# Patient Record
Sex: FEMALE | Race: WHITE | NOT HISPANIC OR LATINO | Employment: FULL TIME | URBAN - METROPOLITAN AREA
[De-identification: names, ages, dates, MRNs, and addresses within clinical notes are randomized per-mention and may not be internally consistent; named-entity substitution may affect disease eponyms.]

---

## 2017-01-03 ENCOUNTER — APPOINTMENT (OUTPATIENT)
Dept: PHYSICAL THERAPY | Facility: CLINIC | Age: 37
End: 2017-01-03
Payer: COMMERCIAL

## 2017-01-03 ENCOUNTER — GENERIC CONVERSION - ENCOUNTER (OUTPATIENT)
Dept: OTHER | Facility: OTHER | Age: 37
End: 2017-01-03

## 2017-01-03 PROCEDURE — 97014 ELECTRIC STIMULATION THERAPY: CPT

## 2017-01-03 PROCEDURE — 97162 PT EVAL MOD COMPLEX 30 MIN: CPT

## 2017-01-03 PROCEDURE — 97110 THERAPEUTIC EXERCISES: CPT

## 2017-01-05 ENCOUNTER — APPOINTMENT (OUTPATIENT)
Dept: PHYSICAL THERAPY | Facility: CLINIC | Age: 37
End: 2017-01-05
Payer: COMMERCIAL

## 2017-01-05 PROCEDURE — 97110 THERAPEUTIC EXERCISES: CPT

## 2017-01-05 PROCEDURE — 97014 ELECTRIC STIMULATION THERAPY: CPT

## 2017-01-05 PROCEDURE — 97140 MANUAL THERAPY 1/> REGIONS: CPT

## 2017-01-09 ENCOUNTER — ALLSCRIPTS OFFICE VISIT (OUTPATIENT)
Dept: OTHER | Facility: OTHER | Age: 37
End: 2017-01-09

## 2017-01-10 ENCOUNTER — APPOINTMENT (OUTPATIENT)
Dept: PHYSICAL THERAPY | Facility: CLINIC | Age: 37
End: 2017-01-10
Payer: COMMERCIAL

## 2017-01-10 PROCEDURE — 97110 THERAPEUTIC EXERCISES: CPT

## 2017-01-10 PROCEDURE — 97014 ELECTRIC STIMULATION THERAPY: CPT

## 2017-01-10 PROCEDURE — 97140 MANUAL THERAPY 1/> REGIONS: CPT

## 2017-01-12 ENCOUNTER — APPOINTMENT (OUTPATIENT)
Dept: PHYSICAL THERAPY | Facility: CLINIC | Age: 37
End: 2017-01-12
Payer: COMMERCIAL

## 2017-01-13 ENCOUNTER — APPOINTMENT (OUTPATIENT)
Dept: PHYSICAL THERAPY | Facility: CLINIC | Age: 37
End: 2017-01-13
Payer: COMMERCIAL

## 2017-01-13 PROCEDURE — 97140 MANUAL THERAPY 1/> REGIONS: CPT

## 2017-01-13 PROCEDURE — 97110 THERAPEUTIC EXERCISES: CPT

## 2017-01-13 PROCEDURE — 97014 ELECTRIC STIMULATION THERAPY: CPT

## 2017-01-19 ENCOUNTER — APPOINTMENT (OUTPATIENT)
Dept: PHYSICAL THERAPY | Facility: CLINIC | Age: 37
End: 2017-01-19
Payer: COMMERCIAL

## 2017-01-19 PROCEDURE — 97140 MANUAL THERAPY 1/> REGIONS: CPT

## 2017-01-19 PROCEDURE — 97110 THERAPEUTIC EXERCISES: CPT

## 2017-01-26 ENCOUNTER — APPOINTMENT (OUTPATIENT)
Dept: PHYSICAL THERAPY | Facility: CLINIC | Age: 37
End: 2017-01-26
Payer: COMMERCIAL

## 2017-01-30 ENCOUNTER — ALLSCRIPTS OFFICE VISIT (OUTPATIENT)
Dept: OTHER | Facility: OTHER | Age: 37
End: 2017-01-30

## 2017-02-02 ENCOUNTER — APPOINTMENT (OUTPATIENT)
Dept: PHYSICAL THERAPY | Facility: CLINIC | Age: 37
End: 2017-02-02
Payer: COMMERCIAL

## 2017-02-02 ENCOUNTER — TRANSCRIBE ORDERS (OUTPATIENT)
Dept: LAB | Facility: CLINIC | Age: 37
End: 2017-02-02

## 2017-02-02 ENCOUNTER — APPOINTMENT (OUTPATIENT)
Dept: LAB | Facility: CLINIC | Age: 37
End: 2017-02-02
Payer: COMMERCIAL

## 2017-02-02 DIAGNOSIS — N92.0 EXCESSIVE OR FREQUENT MENSTRUATION: Primary | ICD-10-CM

## 2017-02-02 DIAGNOSIS — N92.0 EXCESSIVE OR FREQUENT MENSTRUATION: ICD-10-CM

## 2017-02-02 LAB
BASOPHILS # BLD AUTO: 0.01 THOUSANDS/ΜL (ref 0–0.1)
BASOPHILS NFR BLD AUTO: 0 % (ref 0–1)
EOSINOPHIL # BLD AUTO: 0.15 THOUSAND/ΜL (ref 0–0.61)
EOSINOPHIL NFR BLD AUTO: 3 % (ref 0–6)
ERYTHROCYTE [DISTWIDTH] IN BLOOD BY AUTOMATED COUNT: 14.8 % (ref 11.6–15.1)
FERRITIN SERPL-MCNC: 3 NG/ML (ref 8–388)
HCT VFR BLD AUTO: 32.9 % (ref 34.8–46.1)
HGB BLD-MCNC: 9.9 G/DL (ref 11.5–15.4)
IRON SERPL-MCNC: 38 UG/DL (ref 50–170)
LYMPHOCYTES # BLD AUTO: 1.5 THOUSANDS/ΜL (ref 0.6–4.47)
LYMPHOCYTES NFR BLD AUTO: 25 % (ref 14–44)
MCH RBC QN AUTO: 24.4 PG (ref 26.8–34.3)
MCHC RBC AUTO-ENTMCNC: 30.1 G/DL (ref 31.4–37.4)
MCV RBC AUTO: 81 FL (ref 82–98)
MONOCYTES # BLD AUTO: 0.33 THOUSAND/ΜL (ref 0.17–1.22)
MONOCYTES NFR BLD AUTO: 6 % (ref 4–12)
NEUTROPHILS # BLD AUTO: 3.92 THOUSANDS/ΜL (ref 1.85–7.62)
NEUTS SEG NFR BLD AUTO: 66 % (ref 43–75)
PLATELET # BLD AUTO: 284 THOUSANDS/UL (ref 149–390)
PMV BLD AUTO: 10.3 FL (ref 8.9–12.7)
RBC # BLD AUTO: 4.06 MILLION/UL (ref 3.81–5.12)
WBC # BLD AUTO: 5.91 THOUSAND/UL (ref 4.31–10.16)

## 2017-02-02 PROCEDURE — 83540 ASSAY OF IRON: CPT

## 2017-02-02 PROCEDURE — 97110 THERAPEUTIC EXERCISES: CPT

## 2017-02-02 PROCEDURE — 36415 COLL VENOUS BLD VENIPUNCTURE: CPT

## 2017-02-02 PROCEDURE — 82728 ASSAY OF FERRITIN: CPT

## 2017-02-02 PROCEDURE — 85025 COMPLETE CBC W/AUTO DIFF WBC: CPT

## 2017-02-21 ENCOUNTER — ALLSCRIPTS OFFICE VISIT (OUTPATIENT)
Dept: OTHER | Facility: OTHER | Age: 37
End: 2017-02-21

## 2017-06-15 ENCOUNTER — GENERIC CONVERSION - ENCOUNTER (OUTPATIENT)
Dept: OTHER | Facility: OTHER | Age: 37
End: 2017-06-15

## 2017-10-26 ENCOUNTER — ALLSCRIPTS OFFICE VISIT (OUTPATIENT)
Dept: OTHER | Facility: OTHER | Age: 37
End: 2017-10-26

## 2017-10-27 NOTE — PROGRESS NOTES
Assessment  1  Menorrhagia (626 2) (N92 0)    Plan  Menorrhagia    · Tranexamic Acid 650 MG Oral Tablet (Lysteda); 2 pills 3 times a day with menses   Rx By: Hiwot Garcia; Dispense: 10 Days ; #:30 Tablet; Refill: 8;For: Menorrhagia; MICEHLLE = N; Verified Transmission to [facility] Last Updated By: System, PLASTIQ; 10/26/2017 9:06:31 AM    Discussion/Summary  Discussion Summary:   Menorrhagia with regular cycle - spent a lot of time reviewing that the D and C would really only be helpful for the cycle that we did it in  We reviewed all of the other options with her to help with decreasing the flow of the menses  We spent time reviewing the options of lLysteda, the progesterone only options including Mirena, and the surgical options including endometrial ablation and hysterectomy  She is not currently using anything for contraception nor has her  had a vasectomy  We did review that some of these options that she is looking into particularly the ablation would require some additional type of contraception  She still feels that the [facility]  might be worthwhile as a  baby step to working through the menorrhagia  At this time she is given information about the Mirena to review with her pathologist to is aware of the breast cancer gene that she tested positive for, although it is not BRCA1 or BRCA2, and we discussed at length the endometrial ablation procedure, risks and benefits and possible sequela, as well as the need for contraception should we proceed with ablation  At this time she is elected to proceed with the use of Lysteda and a prescription was sent to her pharmacy  If she decides that she also proceed with a hysteroscopy/D&C she will give the office call we will schedule it and then see her again for her H&P just prior to that procedure   Again she was made aware that I felt the D&C might only help for that particular cycle but she feels that that might be an option she wants to try before proceeding with anything else  Counseling Documentation With Imm: The patient was counseled regarding instructions for management,-- prognosis,-- risks and benefits of treatment options  total time of encounter was 20 minutes-- and-- 20 minutes was spent counseling  Goals and Barriers: The patient has the current Goals: Discuss possible surgical procedures  The patent has the current Barriers: None  Patient's Capacity to Self-Care: Patient is able to Self-Care  Medication SE Review and Pt Understands Tx: The treatment plan was reviewed with the patient/guardian  The patient/guardian understands and agrees with the treatment plan      Chief Complaint  Chief Complaint Free Text Note Form: menorrhagia - anemia      History of Present Illness  HPI: Here to review possible D & C - patient has regular menses - heavy - and anemia requiring iron transfusions -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 which she says was normal without any fibroids or other changes - she tested positive for one of the newer breast cancer genes and is nervous about any type of hormonal therapy - thought a D and C would help to clean out anything that may be causing the heavier bleeding -      Review of Systems  Focused-Female:   Constitutional: No fever, no chills, feels well, no tiredness, no recent weight gain or loss  Cardiovascular: no complaints of slow or fast heart rate, no chest pain, no palpitations, no leg claudication or lower extremity edema  Respiratory: no complaints of shortness of breath, no wheezing, no dyspnea on exertion, no orthopnea or PND  Breasts: no complaints of breast pain, breast lump or nipple discharge  Gastrointestinal: no complaints of abdominal pain, no constipation, no nausea or diarrhea, no vomiting, no bloody stools  Genitourinary: no complaints of dysuria, no incontinence, no pelvic pain, no dysmenorrhea, no vaginal discharge or abnormal vaginal bleeding     Musculoskeletal: no complaints of arthralgia, no myalgia, no joint swelling or stiffness, no limb pain or swelling  Neurological: no complaints of headache, no confusion, no numbness or tingling, no dizziness or fainting  ROS Reviewed:   ROS reviewed  Active Problems  1  Derangement of lateral meniscus of left knee (717 40) (M23 301)   2  Derangement of lateral meniscus of right knee (717 40) (M23 300)   3  Derangement of medial meniscus of left knee (717 3) (M23 304)   4  Encounter for gynecological examination (V72 31) (Z01 419)   5  Left knee pain (719 46) (M25 562)   6  Menorrhagia (626 2) (N92 0)    Past Medical History  1  History of Breast cancer screening (V76 10) (Z12 39)   2  History of  2  Active Problems And Past Medical History Reviewed: The active problems and past medical history were reviewed and updated today  Surgical History  1  History of  Section   2  History of Gallbladder Surgery   3  History of Knee Surgery  Surgical History Reviewed: The surgical history was reviewed and updated today  Family History  Mother    1  Family history of malignant neoplasm of breast (V16 3) (Z80 3)  Family History Reviewed: The family history was reviewed and updated today  Social History   · Never a smoker  Social History Reviewed: The social history was reviewed and updated today  Current Meds   1  Daily Multivitamin TABS; Therapy: (Recorded:2017) to Recorded   2  Iron 325 (65 Fe) MG Oral Tablet; Therapy: (Recorded:2017) to Recorded   3  Iron Combinations INJ; Therapy: (Recorded:2017) to Recorded   4  Probiotic CAPS; Therapy: (Recorded:2017) to Recorded   5  Vitamin D 1000 UNIT CAPS; Therapy: (Recorded:2017) to Recorded  Medication List Reviewed: The medication list was reviewed and updated today  Allergies  1  Penicillins  2   Shellfish    Vitals  Vital Signs    Recorded: [id number] 85:24VM   Systolic 016, LUE, Sitting   Diastolic 76, LUE, Sitting Height 5 ft    Weight 197 lb    BMI Calculated 38 47   BSA Calculated 1 85     Physical Exam    Constitutional   General appearance: No acute distress, well appearing and well nourished  -- short obese white female     Psychiatric   Orientation to person, place, and time: Normal     Mood and affect: Normal        Signatures   Electronically signed by : ASIA Cabral ; Oct 26 2017  9:35AM EST                       (Author)

## 2018-01-13 VITALS
WEIGHT: 188 LBS | DIASTOLIC BLOOD PRESSURE: 82 MMHG | SYSTOLIC BLOOD PRESSURE: 134 MMHG | BODY MASS INDEX: 36.91 KG/M2 | HEIGHT: 60 IN

## 2018-01-13 VITALS
BODY MASS INDEX: 38.68 KG/M2 | HEIGHT: 60 IN | WEIGHT: 197 LBS | DIASTOLIC BLOOD PRESSURE: 76 MMHG | SYSTOLIC BLOOD PRESSURE: 128 MMHG

## 2018-09-19 ENCOUNTER — ANNUAL EXAM (OUTPATIENT)
Dept: OBGYN CLINIC | Facility: CLINIC | Age: 38
End: 2018-09-19
Payer: COMMERCIAL

## 2018-09-19 VITALS
HEIGHT: 60 IN | WEIGHT: 197 LBS | DIASTOLIC BLOOD PRESSURE: 84 MMHG | SYSTOLIC BLOOD PRESSURE: 134 MMHG | BODY MASS INDEX: 38.68 KG/M2

## 2018-09-19 DIAGNOSIS — N92.0 MENORRHAGIA WITH REGULAR CYCLE: ICD-10-CM

## 2018-09-19 DIAGNOSIS — D50.9 IRON DEFICIENCY ANEMIA, UNSPECIFIED IRON DEFICIENCY ANEMIA TYPE: ICD-10-CM

## 2018-09-19 DIAGNOSIS — Z12.39 SCREENING BREAST EXAMINATION: ICD-10-CM

## 2018-09-19 DIAGNOSIS — Z01.419 WELL WOMAN EXAM: Primary | ICD-10-CM

## 2018-09-19 PROBLEM — M19.90 ARTHRITIS: Status: ACTIVE | Noted: 2017-10-26

## 2018-09-19 PROCEDURE — S0612 ANNUAL GYNECOLOGICAL EXAMINA: HCPCS | Performed by: PHYSICIAN ASSISTANT

## 2018-09-19 RX ORDER — OMEPRAZOLE 20 MG/1
CAPSULE, DELAYED RELEASE ORAL
COMMUNITY
Start: 2018-04-19 | End: 2022-07-19

## 2018-09-19 RX ORDER — BIOTIN 1 MG
TABLET ORAL
COMMUNITY
End: 2018-11-23 | Stop reason: ALTCHOICE

## 2018-09-19 RX ORDER — ALBUTEROL SULFATE 90 UG/1
2 AEROSOL, METERED RESPIRATORY (INHALATION) EVERY 6 HOURS
COMMUNITY
Start: 2018-05-07 | End: 2018-10-22 | Stop reason: ALTCHOICE

## 2018-09-19 RX ORDER — ALPRAZOLAM 0.5 MG/1
TABLET ORAL
Qty: 1 TABLET | Refills: 0 | Status: SHIPPED | OUTPATIENT
Start: 2018-09-19 | End: 2018-10-22 | Stop reason: ALTCHOICE

## 2018-09-19 RX ORDER — PNV NO.95/FERROUS FUM/FOLIC AC 28MG-0.8MG
TABLET ORAL
COMMUNITY
End: 2018-10-22 | Stop reason: ALTCHOICE

## 2018-09-19 RX ORDER — ALPRAZOLAM 0.25 MG/1
TABLET ORAL
Qty: 30 TABLET | Refills: 0 | Status: SHIPPED | OUTPATIENT
Start: 2018-09-19 | End: 2018-09-19 | Stop reason: CLARIF

## 2018-09-19 RX ORDER — TRANEXAMIC ACID 650 1/1
TABLET ORAL
COMMUNITY
Start: 2017-10-26 | End: 2018-10-22 | Stop reason: ALTCHOICE

## 2018-09-19 RX ORDER — ERGOCALCIFEROL (VITAMIN D2) 10 MCG
TABLET ORAL
COMMUNITY
End: 2018-11-23 | Stop reason: ALTCHOICE

## 2018-09-19 NOTE — PATIENT INSTRUCTIONS
Pt will plan endo bx and proceed with ablation at this point  Aware pelvic US also to be done prior to ablation  Will plan CBC as well to see if additional infusions are needed prior to procedure

## 2018-09-19 NOTE — PROGRESS NOTES
Assessment/Plan   Diagnoses and all orders for this visit:    Well woman exam  -     GP Liquid-Based Pap + HPV Plus    Screening breast examination  -     Mammo screening bilateral w 3d & cad; Future    Iron deficiency anemia, unspecified iron deficiency anemia type  -     CBC and differential; Future  -     Iron, TIBC and Ferritin Panel; Future    Other orders  -     albuterol (PROVENTIL HFA,VENTOLIN HFA) 90 mcg/act inhaler; Inhale 2 puffs every 6 (six) hours  -     Multiple Vitamin (DAILY VALUE MULTIVITAMIN) TABS; Take by mouth  -     Ferrous Sulfate (IRON) 325 (65 Fe) MG TABS; Take by mouth  -     omeprazole (PriLOSEC) 20 mg delayed release capsule; As needed  -     Probiotic Product (PROBIOTIC-10) CAPS; Take by mouth  -     Tranexamic Acid 650 MG TABS; Take by mouth  -     Cholecalciferol (VITAMIN D3) 1000 units CAPS; Take by mouth        Discussion    All questions have been answered to her satisfaction  RTO for APE or sooner if needed      Subjective     Pt for annual GYN exam  Pt with continued menorrhagia  She is anxious to proceed with endo bx but does desire ablation to improve sx  She has already had surgery discussion w Dr Gracie Thompson  I thoroughly r/w pt endo bx procedure  Pt denies vaginal d/c or GYN complaints other than cycles  Nighat rOr is a 34 y o  female who presents for annual well woman exam      No vulvar itch/burn; No vaginal itch/burn; No abn discharge or odor; No urinary sx - burning/pain/frequency/hematuria  (+) SBEs - no breast masses, asymmetry, nipple discharge or bleeding, changes in skin of breast, or breast tenderness bilaterally  No abd/pelvic pain or HAs; No menopausal symptoms: No hot flashes/night sweats, problems with intercourse, vaginal dryness; sleeping well;   Pt is sexually active in a mutually monog/ sexual relationship; No issues with intercourse;  She declines std/hiv/hep testing; Feels safe at home  Current contraception: rhythm method    (+) PCP for routine Bw/care;      Review of Systems   Constitutional: Negative for fatigue, fever and unexpected weight change  HENT: Negative for dental problem, mouth sores, nosebleeds, rhinorrhea, sinus pain, sinus pressure and sore throat  Eyes: Negative for pain, discharge and visual disturbance  Respiratory: Negative for cough, chest tightness, shortness of breath and wheezing  Cardiovascular: Negative for chest pain, palpitations and leg swelling  Gastrointestinal: Negative for blood in stool, constipation, diarrhea, nausea and vomiting  Endocrine: Negative for polydipsia  Genitourinary: Positive for menstrual problem and vaginal bleeding  Negative for difficulty urinating, dyspareunia, dysuria, pelvic pain, urgency, vaginal discharge and vaginal pain  Musculoskeletal: Negative for arthralgias, back pain and joint swelling  Allergic/Immunologic: Negative for environmental allergies  Neurological: Negative for seizures, light-headedness and headaches  Hematological: Does not bruise/bleed easily  Psychiatric/Behavioral: Negative for sleep disturbance  The patient is not nervous/anxious          The following portions of the patient's history were reviewed and updated as appropriate: current medications, past family history, past medical history, past social history, past surgical history and problem list     Period Cycle (Days): 28  Period Duration (Days): 7  Period Pattern: Regular  Menstrual Flow: Heavy    OB History      Para Term  AB Living    2 2            SAB TAB Ectopic Multiple Live Births            2          Past Medical History:   Diagnosis Date    Anemia        Past Surgical History:   Procedure Laterality Date    ABDOMINAL SURGERY      2 c-sections    ARTHROSCOPY KNEE Left 2016    Procedure: ARTHROSCOPY KNEE LATERAL aND MEDIAL MENISCECTOMY;  Surgeon: Kamilah Alejo MD;  Location: [facility] MAIN OR;  Service:    [address]   lap    KNEE ARTHROSCOPY Left 2007       Family History   Problem Relation Age of Onset    Hyperlipidemia Mother     Hypertension Mother    Analy [place] Breast cancer Mother     Hypertension Father     Heart disease Father     Breast cancer Maternal Grandmother        Social History     Social History    Marital status: /Civil Union     Spouse name: N/A    Number of children: N/A    Years of education: N/A     Occupational History    Not on file       Social History Main Topics    Smoking status: Never Smoker    Smokeless tobacco: Never Used    Alcohol use No    Drug use: No    Sexual activity: Yes     Partners: Male     Birth control/ protection: None     Other Topics Concern    Not on file     Social History Narrative    No narrative on file         Current Outpatient Prescriptions:     albuterol (PROVENTIL HFA,VENTOLIN HFA) 90 mcg/act inhaler, Inhale 2 puffs every 6 (six) hours, Disp: , Rfl:     omeprazole (PriLOSEC) 20 mg delayed release capsule, As needed, Disp: , Rfl:     Tranexamic Acid 650 MG TABS, Take by mouth, Disp: , Rfl:     Cholecalciferol (VITAMIN D-3 PO), Take by mouth 3 (three) times a week, Disp: , Rfl:     Cholecalciferol (VITAMIN D3) 1000 units CAPS, Take by mouth, Disp: , Rfl:     Ferrous Sulfate (IRON) 325 (65 Fe) MG TABS, Take by mouth, Disp: , Rfl:     Lactobacillus-Inulin ([employer] DIGESTIVE HEALTH PO), Take by mouth 3 (three) times a week, Disp: , Rfl:     Multiple Vitamin (DAILY VALUE MULTIVITAMIN) TABS, Take by mouth, Disp: , Rfl:     Probiotic Product (PROBIOTIC-10) CAPS, Take by mouth, Disp: , Rfl:     Allergies   Allergen Reactions    Other Hives     Shrimp/shellfish    Penicillin G Hives    Shellfish Allergy Hives and Edema       Objective   Vitals:    09/19/18 1140   BP: 134/84   BP Location: Left arm   Patient Position: Sitting   Cuff Size: Large   Weight: 89 4 kg (197 lb)   Height: 5' (1 524 m)     Physical Exam   Constitutional: She is oriented to person, place, and time  She appears well-developed and well-nourished  HENT:   Head: Normocephalic and atraumatic  Cardiovascular: Normal rate and regular rhythm  Pulmonary/Chest: Effort normal and breath sounds normal  Right breast exhibits no inverted nipple, no mass, no nipple discharge, no skin change and no tenderness  Left breast exhibits no inverted nipple, no mass, no nipple discharge, no skin change and no tenderness  Breasts are symmetrical    Abdominal: Soft  She exhibits no distension and no mass  There is no rebound and no guarding  Genitourinary: Vagina normal and uterus normal  Rectal exam shows guaiac negative stool  Neurological: She is alert and oriented to person, place, and time  Skin: Skin is warm and dry  Psychiatric: She has a normal mood and affect  Patient Instructions   Pt will plan endo bx and proceed with ablation at this point  Aware pelvic US also to be done prior to ablation  Will plan CBC as well to see if additional infusions are needed prior to procedure

## 2018-09-23 LAB
HPV HR 12 DNA CVX QL NAA+PROBE: NOT DETECTED
HPV16 DNA SPEC QL NAA+PROBE: NOT DETECTED
HPV18 DNA SPEC QL NAA+PROBE: NOT DETECTED
THIN PREP CVX: NORMAL

## 2018-09-27 ENCOUNTER — HOSPITAL ENCOUNTER (OUTPATIENT)
Dept: RADIOLOGY | Age: 38
Discharge: HOME | End: 2018-09-27
Payer: COMMERCIAL

## 2018-09-27 DIAGNOSIS — N92.0 MENORRHAGIA WITH REGULAR CYCLE: ICD-10-CM

## 2018-09-27 PROCEDURE — 76856 US EXAM PELVIC COMPLETE: CPT

## 2018-09-27 PROCEDURE — 76830 TRANSVAGINAL US NON-OB: CPT

## 2018-10-17 PROCEDURE — 58100 BIOPSY OF UTERUS LINING: CPT | Performed by: PHYSICIAN ASSISTANT

## 2018-10-17 NOTE — PROGRESS NOTES
Endometrial biopsy  Date/Time: 10/17/2018 11:45 AM  Performed by: [facility]  Authorized by: [facility]     Consent:     Consent obtained:  Verbal and written    Consent given by:  Patient    Procedural risks discussed:  Bleeding, damage to other organs, death, failure rate, infection, possible continued pain, possible conversion to open surgery, possible loss of function and repeat procedure    Patient questions answered: yes      Patient agrees, verbalizes understanding, and wants to proceed: yes    Indication:     Indications: Other disorder of menstruation and other abnormal bleeding from female genital tract    Procedure:     Procedure: endometrial biopsy with Pipelle      A bivalve speculum was placed in the vagina: yes      Cervix cleaned and prepped: yes (with vinegar as pt has shellfish allergy)      A paracervical block was performed: no      An intracervical block was performed: no      The cervix was dilated: no      Uterus sounded: yes      Uterus sound depth (cm):  7 5    Specimen collected: specimen collected and sent to pathology      Patient tolerated procedure well with no complications: yes    Comments:      Pt plans to proceed with endometrial ablation  Will make preop appt to f/u w doc  She did discuss ablation last year w Dr Bishop Barakat and is ready to proceed

## 2018-10-22 ENCOUNTER — PROCEDURE VISIT (OUTPATIENT)
Dept: OBGYN CLINIC | Facility: CLINIC | Age: 38
End: 2018-10-22
Payer: COMMERCIAL

## 2018-10-22 ENCOUNTER — TELEPHONE (OUTPATIENT)
Dept: OBGYN CLINIC | Facility: CLINIC | Age: 38
End: 2018-10-22

## 2018-10-22 VITALS
BODY MASS INDEX: 38.48 KG/M2 | SYSTOLIC BLOOD PRESSURE: 128 MMHG | WEIGHT: 196 LBS | DIASTOLIC BLOOD PRESSURE: 76 MMHG | HEIGHT: 60 IN

## 2018-10-22 DIAGNOSIS — N92.0 MENORRHAGIA WITH REGULAR CYCLE: Primary | ICD-10-CM

## 2018-10-22 PROCEDURE — 58100 BIOPSY OF UTERUS LINING: CPT | Performed by: PHYSICIAN ASSISTANT

## 2018-10-24 LAB — BIOPSY SPEC-IMP: NORMAL

## 2018-11-19 ENCOUNTER — OFFICE VISIT (OUTPATIENT)
Dept: OBGYN CLINIC | Facility: CLINIC | Age: 38
End: 2018-11-19
Payer: COMMERCIAL

## 2018-11-19 VITALS
HEIGHT: 60 IN | BODY MASS INDEX: 37.5 KG/M2 | DIASTOLIC BLOOD PRESSURE: 84 MMHG | SYSTOLIC BLOOD PRESSURE: 136 MMHG | WEIGHT: 191 LBS

## 2018-11-19 DIAGNOSIS — N92.0 MENORRHAGIA WITH REGULAR CYCLE: Primary | ICD-10-CM

## 2018-11-19 PROCEDURE — 99213 OFFICE O/P EST LOW 20 MIN: CPT | Performed by: OBSTETRICS & GYNECOLOGY

## 2018-11-19 RX ORDER — INFLUENZA A VIRUS A/SINGAPORE/GP1908/2015 IVR-180A (H1N1) ANTIGEN (PROPIOLACTONE INACTIVATED), INFLUENZA A VIRUS A/HONG KONG/4801/2014 X-263B (H3N2) ANTIGEN (PROPIOLACTONE INACTIVATED), INFLUENZA B VIRUS B/BRISBANE/46/2015 ANTIGEN (PROPIOLACTONE INACTIVATED), AND INFLUENZA B VIRUS B/PHUKET/3073/2013 BVR-1B ANTIGEN (PROPIOLACTONE INACTIVATED) 15; 15; 15; 15 UG/.5ML; UG/.5ML; UG/.5ML; UG/.5ML
INJECTION, SUSPENSION INTRAMUSCULAR
Refills: 0 | COMMUNITY
Start: 2018-10-31 | End: 2018-11-23 | Stop reason: ALTCHOICE

## 2018-11-19 NOTE — PROGRESS NOTES
Karley Elizabeth is a 34 y o   female here for a problem visit  Patient has had heavy menses causing anemia they are relatively regular and has been getting IV iron infusions for about a year and half  Patient's iron still decreases but hemoglobin is back to normal level  Patient has been previously counseled and was counseled again regarding options as far as risks and benefits to treat her symptoms  Normal endometrial biopsy 2018  After reviewing options as well as differences of NovaSure minora patient desires to proceed with D&C hysteroscopy Naila ablation  Personal health questionnaire reviewed: yes  Gynecologic History  No LMP recorded  Contraception: rhythm method  Last Pap:  2018  Results were: normal  Endometrial biopsy 2018 normal  Last mammogram:  2018   Results were: normal    Obstetric History  OB History    Para Term  AB Living   2 2           SAB TAB Ectopic Multiple Live Births           2      # Outcome Date GA Lbr Xavier/2nd Weight Sex Delivery Anes PTL Lv   2 Para            1 Para                 Past Medical History:   Diagnosis Date    Anemia      Past Surgical History:   Procedure Laterality Date    ABDOMINAL SURGERY      2 c-sections    ARTHROSCOPY KNEE Left 2016    Procedure: ARTHROSCOPY KNEE LATERAL aND MEDIAL MENISCECTOMY;  Surgeon: Aleksandra Matos MD;  Location: [facility] [place] MAIN OR;  Service:      SECTION      CHOLECYSTECTOMY      lap    KNEE ARTHROSCOPY Left      Current Outpatient Prescriptions on File Prior to Visit   Medication Sig Dispense Refill    Cholecalciferol (VITAMIN D-3 PO) Take by mouth 3 (three) times a week      Cholecalciferol (VITAMIN D3) 1000 units CAPS Take by mouth      Lactobacillus-Inulin (CULTUREE DIGESTIVE HEALTH PO) Take by mouth 3 (three) times a week      Multiple Vitamin (DAILY VALUE MULTIVITAMIN) TABS Take by mouth      omeprazole (PriLOSEC) 20 mg delayed release capsule As needed      Probiotic Product (PROBIOTIC-10) CAPS Take by mouth       No current facility-administered medications on file prior to visit  Allergies   Allergen Reactions    Other Hives     Shrimp/shellfish    Penicillin G Hives    Shellfish Allergy Hives and Edema     Social History     Social History    Marital status: /Civil Union     Spouse name: N/A    Number of children: N/A    Years of education: N/A     Occupational History    Not on file  Social History Main Topics    Smoking status: Never Smoker    Smokeless tobacco: Never Used    Alcohol use No    Drug use: No    Sexual activity: Yes     Partners: Male     Birth control/ protection: None     Other Topics Concern    Not on file     Social History Narrative    No narrative on file         Review of Systems  Review of Systems   Constitutional: Negative for fatigue, fever and unexpected weight change  HENT: Negative for dental problem, mouth sores, nosebleeds, rhinorrhea, sinus pain, sinus pressure and sore throat  Eyes: Negative for pain, discharge and visual disturbance  Respiratory: Negative for cough, chest tightness, shortness of breath and wheezing  Cardiovascular: Negative for chest pain, palpitations and leg swelling  Gastrointestinal: Negative for blood in stool, constipation, diarrhea, nausea and vomiting  Endocrine: Negative for polydipsia  Genitourinary: Negative for difficulty urinating, dyspareunia, dysuria, menstrual problem, pelvic pain, urgency, vaginal discharge and vaginal pain  Musculoskeletal: Negative for arthralgias, back pain and joint swelling  Allergic/Immunologic: Negative for environmental allergies  Neurological: Negative for seizures, light-headedness and headaches  Hematological: Does not bruise/bleed easily  Psychiatric/Behavioral: Negative for sleep disturbance  The patient is not nervous/anxious      All other systems reviewed and are negative  Objective     /84 (BP Location: Left arm, Patient Position: Sitting, Cuff Size: Standard)   Ht 5' (1 524 m)   Wt 86 6 kg (191 lb)   BMI 37 30 kg/m²   Lungs: clear to auscultation bilaterally  Heart: regular rate and rhythm, S1, S2 normal, no murmur, click, rub or gallop  Abdomen: soft, non-tender; bowel sounds normal; no masses,  no organomegaly      Assessment    34year-old  history  x2 with heavy menses and anemia       Plan    D&C hysteroscopy Naila ablation

## 2018-11-20 PROBLEM — N92.0 MENORRHAGIA WITH REGULAR CYCLE: Status: ACTIVE | Noted: 2018-11-20

## 2018-11-25 NOTE — H&P
Skylar Jacques is a 47-year-old  with heavy menses causing anemia  Patient has been requiring IV iron infusions for about 18 months  Patient's hemoglobin is back to normal levels though her iron is still decreased  Patient fully counseled regarding options as well as risks and benefits of different procedures  Patient with normal endometrial biopsy 2018  Patient desires to proceed with D&C hysteroscopy Naila ablation after being fully counseled to options as well as risks and benefits  Last Pap 2018 normal  Last mammogram 2018 normal  Endometrial biopsy 2018 normal    Past Medical History:   Diagnosis Date    Anemia     Arthritis      Past Surgical History:   Procedure Laterality Date    ABDOMINAL SURGERY      2 c-sections    ARTHROSCOPY KNEE Left 2016    Procedure: ARTHROSCOPY KNEE LATERAL aND MEDIAL MENISCECTOMY;  Surgeon: Stepyh Lorenzo MD;  Location: [place] [address] MAIN OR;  Service:      SECTION      CHOLECYSTECTOMY      lap    KNEE ARTHROSCOPY Left      OB History      Para Term  AB Living    2 2            SAB TAB Ectopic Multiple Live Births            2          No current facility-administered medications on file prior to encounter        Current Outpatient Prescriptions on File Prior to Encounter   Medication Sig Dispense Refill    omeprazole (PriLOSEC) 20 mg delayed release capsule As needed       Allergies   Allergen Reactions    Other Hives     Shrimp/shellfish    Penicillin G Hives    Pepcid [Famotidine] Chest Pain    Shellfish Allergy Hives and Edema     Social History   Substance Use Topics    Smoking status: Never Smoker    Smokeless tobacco: Never Used    Alcohol use No    Drug use no  Monogamous  using rhythm method for contraception    Physical exam:  Height 5 foot, weight 191 lb (86 6 kg), BMI 37 3, /84    Lungs:  CTAB  Heart:  RRR S1-S2  Abdomen:  Soft nontender positive bowel sounds no organomegaly    Assessment:  59-year-old  history of  x2 with heavy menses and anemia  Plan:  D&C hysteroscopy Naila ablation

## 2018-11-28 ENCOUNTER — ANESTHESIA EVENT (OUTPATIENT)
Dept: PERIOP | Facility: HOSPITAL | Age: 38
End: 2018-11-28
Payer: COMMERCIAL

## 2018-11-29 ENCOUNTER — ANESTHESIA (OUTPATIENT)
Dept: PERIOP | Facility: HOSPITAL | Age: 38
End: 2018-11-29
Payer: COMMERCIAL

## 2018-11-29 ENCOUNTER — HOSPITAL ENCOUNTER (OUTPATIENT)
Facility: HOSPITAL | Age: 38
Setting detail: OUTPATIENT SURGERY
Discharge: HOME | End: 2018-11-29
Attending: OBSTETRICS & GYNECOLOGY | Admitting: OBSTETRICS & GYNECOLOGY
Payer: COMMERCIAL

## 2018-11-29 VITALS
BODY MASS INDEX: 37.3 KG/M2 | RESPIRATION RATE: 16 BRPM | WEIGHT: 190 LBS | HEIGHT: 60 IN | OXYGEN SATURATION: 94 % | DIASTOLIC BLOOD PRESSURE: 89 MMHG | HEART RATE: 60 BPM | SYSTOLIC BLOOD PRESSURE: 150 MMHG | TEMPERATURE: 100.4 F

## 2018-11-29 DIAGNOSIS — N92.0 MENORRHAGIA WITH REGULAR CYCLE: ICD-10-CM

## 2018-11-29 DIAGNOSIS — Z98.890 STATUS POST ENDOMETRIAL ABLATION: Primary | ICD-10-CM

## 2018-11-29 LAB — EXT PREGNANCY TEST URINE: NEGATIVE

## 2018-11-29 PROCEDURE — 58563 HYSTEROSCOPY ABLATION: CPT | Performed by: OBSTETRICS & GYNECOLOGY

## 2018-11-29 PROCEDURE — 88305 TISSUE EXAM BY PATHOLOGIST: CPT | Performed by: PATHOLOGY

## 2018-11-29 PROCEDURE — 81025 URINE PREGNANCY TEST: CPT | Performed by: OBSTETRICS & GYNECOLOGY

## 2018-11-29 RX ORDER — MIDAZOLAM HYDROCHLORIDE 1 MG/ML
INJECTION INTRAMUSCULAR; INTRAVENOUS AS NEEDED
Status: DISCONTINUED | OUTPATIENT
Start: 2018-11-29 | End: 2018-11-29 | Stop reason: SURG

## 2018-11-29 RX ORDER — FENTANYL CITRATE/PF 50 MCG/ML
25 SYRINGE (ML) INJECTION
Status: DISCONTINUED | OUTPATIENT
Start: 2018-11-29 | End: 2018-11-29 | Stop reason: HOSPADM

## 2018-11-29 RX ORDER — SODIUM CHLORIDE, SODIUM LACTATE, POTASSIUM CHLORIDE, CALCIUM CHLORIDE 600; 310; 30; 20 MG/100ML; MG/100ML; MG/100ML; MG/100ML
75 INJECTION, SOLUTION INTRAVENOUS CONTINUOUS
Status: DISCONTINUED | OUTPATIENT
Start: 2018-11-29 | End: 2018-11-29 | Stop reason: HOSPADM

## 2018-11-29 RX ORDER — SENNOSIDES 8.6 MG
650 CAPSULE ORAL EVERY 8 HOURS PRN
Qty: 30 TABLET | Refills: 0 | Status: SHIPPED | OUTPATIENT
Start: 2018-11-29 | End: 2022-07-18

## 2018-11-29 RX ORDER — ONDANSETRON 2 MG/ML
4 INJECTION INTRAMUSCULAR; INTRAVENOUS ONCE AS NEEDED
Status: DISCONTINUED | OUTPATIENT
Start: 2018-11-29 | End: 2018-11-29 | Stop reason: HOSPADM

## 2018-11-29 RX ORDER — OXYCODONE HYDROCHLORIDE AND ACETAMINOPHEN 5; 325 MG/1; MG/1
1 TABLET ORAL EVERY 4 HOURS PRN
Status: DISCONTINUED | OUTPATIENT
Start: 2018-11-29 | End: 2018-11-29 | Stop reason: HOSPADM

## 2018-11-29 RX ORDER — OXYCODONE HYDROCHLORIDE AND ACETAMINOPHEN 5; 325 MG/1; MG/1
2 TABLET ORAL EVERY 4 HOURS PRN
Status: DISCONTINUED | OUTPATIENT
Start: 2018-11-29 | End: 2018-11-29 | Stop reason: HOSPADM

## 2018-11-29 RX ORDER — SODIUM CHLORIDE, SODIUM LACTATE, POTASSIUM CHLORIDE, CALCIUM CHLORIDE 600; 310; 30; 20 MG/100ML; MG/100ML; MG/100ML; MG/100ML
125 INJECTION, SOLUTION INTRAVENOUS CONTINUOUS
Status: DISCONTINUED | OUTPATIENT
Start: 2018-11-29 | End: 2018-11-29 | Stop reason: HOSPADM

## 2018-11-29 RX ORDER — KETOROLAC TROMETHAMINE 30 MG/ML
INJECTION, SOLUTION INTRAMUSCULAR; INTRAVENOUS AS NEEDED
Status: DISCONTINUED | OUTPATIENT
Start: 2018-11-29 | End: 2018-11-29 | Stop reason: SURG

## 2018-11-29 RX ORDER — MAGNESIUM HYDROXIDE 1200 MG/15ML
LIQUID ORAL AS NEEDED
Status: DISCONTINUED | OUTPATIENT
Start: 2018-11-29 | End: 2018-11-29 | Stop reason: HOSPADM

## 2018-11-29 RX ORDER — DIPHENHYDRAMINE HYDROCHLORIDE 50 MG/ML
12.5 INJECTION INTRAMUSCULAR; INTRAVENOUS ONCE
Status: COMPLETED | OUTPATIENT
Start: 2018-11-29 | End: 2018-11-29

## 2018-11-29 RX ORDER — PROPOFOL 10 MG/ML
INJECTION, EMULSION INTRAVENOUS AS NEEDED
Status: DISCONTINUED | OUTPATIENT
Start: 2018-11-29 | End: 2018-11-29 | Stop reason: SURG

## 2018-11-29 RX ORDER — IBUPROFEN 600 MG/1
600 TABLET ORAL EVERY 6 HOURS PRN
Status: DISCONTINUED | OUTPATIENT
Start: 2018-11-29 | End: 2018-11-29 | Stop reason: HOSPADM

## 2018-11-29 RX ORDER — ONDANSETRON 2 MG/ML
INJECTION INTRAMUSCULAR; INTRAVENOUS AS NEEDED
Status: DISCONTINUED | OUTPATIENT
Start: 2018-11-29 | End: 2018-11-29 | Stop reason: SURG

## 2018-11-29 RX ORDER — LIDOCAINE HYDROCHLORIDE 10 MG/ML
INJECTION, SOLUTION INFILTRATION; PERINEURAL AS NEEDED
Status: DISCONTINUED | OUTPATIENT
Start: 2018-11-29 | End: 2018-11-29 | Stop reason: SURG

## 2018-11-29 RX ORDER — FENTANYL CITRATE 50 UG/ML
INJECTION, SOLUTION INTRAMUSCULAR; INTRAVENOUS AS NEEDED
Status: DISCONTINUED | OUTPATIENT
Start: 2018-11-29 | End: 2018-11-29 | Stop reason: SURG

## 2018-11-29 RX ADMIN — FENTANYL CITRATE 25 MCG: 50 INJECTION, SOLUTION INTRAMUSCULAR; INTRAVENOUS at 14:09

## 2018-11-29 RX ADMIN — FENTANYL CITRATE 25 MCG: 50 INJECTION, SOLUTION INTRAMUSCULAR; INTRAVENOUS at 14:03

## 2018-11-29 RX ADMIN — ONDANSETRON 4 MG: 2 INJECTION INTRAMUSCULAR; INTRAVENOUS at 14:16

## 2018-11-29 RX ADMIN — PROPOFOL 200 MG: 10 INJECTION, EMULSION INTRAVENOUS at 14:03

## 2018-11-29 RX ADMIN — KETOROLAC TROMETHAMINE 30 MG: 30 INJECTION, SOLUTION INTRAMUSCULAR at 14:30

## 2018-11-29 RX ADMIN — LIDOCAINE HYDROCHLORIDE 50 MG: 10 INJECTION, SOLUTION INFILTRATION; PERINEURAL at 14:03

## 2018-11-29 RX ADMIN — FENTANYL CITRATE 25 MCG: 50 INJECTION, SOLUTION INTRAMUSCULAR; INTRAVENOUS at 14:12

## 2018-11-29 RX ADMIN — MIDAZOLAM 2 MG: 1 INJECTION INTRAMUSCULAR; INTRAVENOUS at 13:55

## 2018-11-29 RX ADMIN — SODIUM CHLORIDE, SODIUM LACTATE, POTASSIUM CHLORIDE, AND CALCIUM CHLORIDE 125 ML/HR: .6; .31; .03; .02 INJECTION, SOLUTION INTRAVENOUS at 13:07

## 2018-11-29 RX ADMIN — DIPHENHYDRAMINE HYDROCHLORIDE 12.5 MG: 50 INJECTION, SOLUTION INTRAMUSCULAR; INTRAVENOUS at 15:45

## 2018-11-29 RX ADMIN — FENTANYL CITRATE 25 MCG: 50 INJECTION, SOLUTION INTRAMUSCULAR; INTRAVENOUS at 15:20

## 2018-11-29 RX ADMIN — FENTANYL CITRATE 25 MCG: 50 INJECTION, SOLUTION INTRAMUSCULAR; INTRAVENOUS at 14:20

## 2018-11-29 NOTE — OP NOTE
OPERATIVE REPORT  PATIENT NAME: Tracy Julien    :  1980  MRN: [id number]  Pt Location: BE OR ROOM 03    SURGERY DATE: 2018    Surgeon(s) and Role:     * Minna Holden MD - Primary     * Fred Pichardo MD - Assisting    Preop Diagnosis:  Menorrhagia with regular cycle [N92 0]    Post-Op Diagnosis Codes:     * Menorrhagia with regular cycle [N92 0]    Procedure(s) (LRB):  DILATATION AND CURETTAGE (D&C) WITH HYSTEROSCOPY (N/A)  ABLATION ENDOMETRIAL ENEDELIA (N/A)    Specimen(s):  ID Type Source Tests Collected by Time Destination   1 : emc Tissue Polyp, Cervical/Endometrial TISSUE EXAM Minna Holden MD 2018 1356    2 : polyp Tissue Polyp, Cervical/Endometrial TISSUE EXAM Minna Holden MD 2018 1420        Estimated Blood Loss:   Minimal    Anesthesia Type:   General    Operative Indications:  Menorrhagia with regular cycle [N92 0]    Operative Findings:   - White, fluffy endometrial tissue   - Polyp at the fundus   - Proliferative endometrium    - small, normal appearing cervix     Complications: None    Procedure and Technique:  Patient was taken to the operating room where timeout was performed to confirm correct patient and correct procedure  General anesthesia was established  Patient was positioned on the operating table in dorsal lithotomy position with legs supported using yellowfin stirrups    An exam under anesthesia revealed an anteverted uterus which was freely mobile with no palpable adnexal masses  The patient was then prepped and draped in the usual sterile fashion       A weighted speculum was placed in the patient's vagina and the anterior lip of the cervix is visualized with the assistance of a right angle retractor  The anterior lip of the cervix was grasped using a single-toothed tenaculum  The uterus was gently sounded to approximately 8 5 cm  The cervix was then gradually dilated to [address] [place] using Aman Jenny dilators  The cervix was measured at 4 cm   A 5 mm hysteroscope was inserted into the uterine cavity  The endometrial lining was noted to be proliferative appearing, ostia were visualized       Endometrial curettings were obtained using a medium-sized sharp curette  Curettage of the endometrial cavity was undertaken in a systematic fashion, encompassing all 360° of the endometrial cavity  These were sent for routine pathology       The Naila endometrial ablation device was inserted into the endometrial cavity  The device was deployed and the cervical balloon was inflated  Safety checks were performed and the device was deployed  A full 2 minutes cycle was completed  The Naila device was then removed from the endometrial cavity    Patient tolerated the procedure well and went to recovery in stable condition        Patient Disposition:  PACU     SIGNATURE: Pati Reyna MD  DATE: November 29, 2018  TIME: 3:00 PM

## 2018-11-29 NOTE — DISCHARGE INSTRUCTIONS
Endometrial Ablation   WHAT YOU SHOULD KNOW:   Endometrial ablation (EA) is a procedure to destroy the endometrium (lining of your uterus)  You may need EA if you have heavy or abnormal vaginal bleeding  CARE AGREEMENT:   You have the right to help plan your care  Learn about your health condition and how it may be treated  Discuss treatment options with your caregivers to decide what care you want to receive  You always have the right to refuse treatment  RISKS:   · You may have nausea, vomiting, or abdominal cramps  You may have vaginal discharge and bleeding after the procedure  Your cervix, uterus, or nearby organs may be burned or damaged  A blockage may form that causes blood to pool inside your uterus  · You may develop an infection in your vagina, urinary tract, or uterus  The infection may spread to other parts of your body  You may get a blood clot in your leg or arm  Your body may absorb too much fluid that was used to widen your uterus  This may cause brain swelling and damage  These may become life-threatening  WHILE YOU ARE HERE:   Before your procedure:   · Informed consent  is a legal document that explains the tests, treatments, or procedures that you may need  Informed consent means you understand what will be done and can make decisions about what you want  You give your permission when you sign the consent form  You can have someone sign this form for you if you are not able to sign it  You have the right to understand your medical care in words you know  Before you sign the consent form, understand the risks and benefits of what will be done  Make sure all your questions are answered  · An IV  is a small tube placed in your vein that is used to give you medicine or liquids  · Antibiotics  may be given before and during your procedure to prevent a bacterial infection  · Anesthesia  is medicine to make you comfortable during the procedure   Your healthcare provider will work with you to decide which anesthesia is best for you  ¨ General anesthesia  will keep you asleep and free from pain during the procedure  Anesthesia may be given through your IV  You may instead breathe it in through a mask or a tube placed down your throat  The tube may cause you to have a sore throat when you wake up  ¨ Local anesthesia  is a shot of medicine put into your cervix and uterus  It is used to numb the area and dull the pain  You may still feel pressure or pushing during the procedure  During your procedure: Your surgeon will widen the opening of your cervix with medicine or medical tools  He will use one of the following to destroy the lining of your uterus:  · Extremely hot fluid  flushed into your uterus or delivered through a balloon at the end of a catheter    · Ice  on the end of a probe to freeze the lining of your uterus    · Electric, microwave, or radiofrequency energy  given off through a heated roller ball, wire loop, probe, or mesh device  After your procedure: You will be taken to a room to rest until you are fully awake  You will be monitored closely for any problems  Do not get out of bed until your healthcare provider says it is okay  You will then be able to go home or be taken to your hospital room  · Pain medicine  may be given  Do not wait until the pain is severe before you ask for more medicine  · Antinausea medicine  helps calm your stomach and prevents vomiting  © 2014 [employer] is for End User's use only and may not be sold, redistributed or otherwise used for commercial purposes  All illustrations and images included in CareNotes® are the copyrighted property of A D [employer] M [employer]  or Aman Alvarado  The above information is an  only  It is not intended as medical advice for individual conditions or treatments   Talk to your doctor, nurse or pharmacist before following any medical regimen to see if it is safe and effective for you  Endometrial Ablation   WHAT YOU NEED TO KNOW:   Endometrial ablation is a procedure to destroy the endometrium (lining of your uterus)  You may need this procedure if you have heavy or abnormal vaginal bleeding  DISCHARGE INSTRUCTIONS:     Seek care immediately if:   · Your arm or leg feels warm, tender, and painful  It may look swollen and red  · You feel dizzy, weak, and confused  · You cannot stop vomiting  · You have severe pain  · You are not able to urinate  Contact your healthcare provider if:   · You have a fever  · You have vaginal bleeding and it is not time for your monthly period  · The bleeding during your monthly period has not decreased  · You have pain when you urinate or see blood in your urine  · You have questions or concerns about your condition or care  Medicines:   · Medicines  can help decrease pain, calm your stomach, and control vomiting  · Take your medicine as directed  Contact your healthcare provider if you think your medicine is not helping or if you have side effects  Tell him or her if you are allergic to any medicine  Keep a list of the medicines, vitamins, and herbs you take  Include the amounts, and when and why you take them  Bring the list or the pill bottles to follow-up visits  Carry your medicine list with you in case of an emergency  Activity:  Ask when you can return to your usual activities  Do not have sex or use tampons or douches for 6 weeks after your procedure, or as directed  Birth control: You may still need to use birth control to prevent pregnancy  Pregnancy risks, such as a miscarriage and tubal pregnancy, are higher after this procedure  Talk to your healthcare provider about birth control or pregnancy after endometrial ablation  Follow up with your healthcare provider as directed:  Write down your questions so you remember to ask them during your visits     © 2017 [facility]  Information is for End User's use only and may not be sold, redistributed or otherwise used for commercial purposes  All illustrations and images included in CareNotes® are the copyrighted property of A D [employer] M , Inc  or Aman Alvarado  The above information is an  only  It is not intended as medical advice for individual conditions or treatments  Talk to your doctor, nurse or pharmacist before following any medical regimen to see if it is safe and effective for you

## 2018-11-29 NOTE — ANESTHESIA POSTPROCEDURE EVALUATION
Post-Op Assessment Note      CV Status:  Stable    Mental Status:  Awake    Hydration Status:  Euvolemic    PONV Controlled:  Controlled    Airway Patency:  Patent    Post Op Vitals Reviewed: Yes          Staff: CRNA           BP   152/93   Temp      Pulse  81   Resp   22   SpO2   98 3LNC

## 2018-11-29 NOTE — ANESTHESIA PREPROCEDURE EVALUATION
Review of Systems/Medical History          Cardiovascular   Pulmonary       GI/Hepatic            Endo/Other    Obesity    GYN       Hematology  Anemia ,     Musculoskeletal    Arthritis     Neurology   Psychology           Physical Exam    Airway    Mallampati score: II         Dental   No notable dental hx     Cardiovascular      Pulmonary      Other Findings        Anesthesia Plan  ASA Score- 2     Anesthesia Type- general with ASA Monitors  Additional Monitors:   Airway Plan: LMA  Comment: I, Dr Ayaan Banks, the attending physician, have personally seen and evaluated the patient prior to anesthetic care  I have reviewed the pre-anesthetic record, and other medical records if appropriate to the anesthetic care  If a CRNA is involved in the case, I have reviewed the CRNA assessment, if present, and agree  The patient is in a suitable condition to proceed with my formulated anesthetic plan        Plan Factors-    Induction- intravenous  Postoperative Plan-     Informed Consent- Anesthetic plan and risks discussed with patient  I personally reviewed this patient with the CRNA  Discussed and agreed on the Anesthesia Plan with the NATHANAEL Garcia

## 2018-11-30 NOTE — OP NOTE
OPERATIVE REPORT  PATIENT NAME: Josephine Romberg    :  1980  MRN: [id number]  Pt Location: [facility] OR ROOM 03    SURGERY DATE: 2018    Surgeon(s) and Role:     * Venkatesh Zamora MD - Primary     * Gino Chavez MD - Assisting    Preop Diagnosis:  Menorrhagia with regular cycle [N92 0]    Post-Op Diagnosis Codes:     * Menorrhagia with regular cycle [N92 0]    Procedure(s) (LRB):  DILATATION AND CURETTAGE (D&C) WITH HYSTEROSCOPY (N/A)  ABLATION ENDOMETRIAL ENEDELIA (N/A)    Specimen(s):  ID Type Source Tests Collected by Time Destination   1 : emc Tissue Endometrium TISSUE EXAM Venkatesh Zamora MD 2018 1356    2 : polyp Tissue Polyp, Cervical/Endometrial TISSUE EXAM Venkatesh Zamora MD 2018 1420        Estimated Blood Loss:   Minimal    Drains:       Anesthesia Type:   General    Operative Indications:  Menorrhagia with regular cycle [N92 0]      Operative Findings:  Endometrial polyp at fundus, normal cavity and ostia otherwise sounding to 8 5 cm with cervix of 4 cm  Complications:   None    Procedure and Technique:  Patient was taken to the operating room where timeout was performed to confirm correct patient and correct procedure  General anesthesia was established  Patient was positioned on the operating table in dorsal lithotomy position with legs supported using yellowfin stirrups  An exam under anesthesia revealed an anteverted uterus which was freely mobile with no palpable adnexal masses  The patient was then prepped and draped in the usual sterile fashion       A weighted speculum was placed in the patient's vagina and the anterior lip of the cervix is visualized with the assistance of a Castaneda retractor  The anterior lip of the cervix was grasped using a single-toothed tenaculum  The uterus was gently sounded to approximately 8 5 cm  The cervix was then gradually dilated to [address] [place] using Aman Jenny dilators  The cervix was measured at 4 cm   A 5 mm hysteroscope was inserted into the uterine cavity  The endometrial lining was noted to be proliferative appearing, ostia were visualized, a moderately sized endometrial polyp was seen in the fundal area centrally       Polyp forceps were placed and polyp was removed and sent as separate specimen  Endometrial curettings were obtained using a medium-sized sharp curette  Curettage of the endometrial cavity was undertaken in a systematic fashion, encompassing all 360° of the endometrial cavity  These were sent for routine pathology       The Naila endometrial ablation device was inserted into the endometrial cavity  The device was deployed and the cervical balloon was inflated  Safety checks were performed and the device was deployed  A full 2 minutes cycle was completed  The Naila device was then removed from the endometrial cavity    Patient tolerated the procedure well and went to recovery in stable condition       I was present for the entire procedure    Patient Disposition:  PACU  and extubated and stable    SIGNATURE: Christiane Chappell MD  DATE: November 29, 2018  TIME: 7:19 PM

## 2018-12-06 ENCOUNTER — OFFICE VISIT (OUTPATIENT)
Dept: HEMATOLOGY ONCOLOGY | Facility: CLINIC | Age: 38
End: 2018-12-06
Payer: COMMERCIAL

## 2018-12-06 VITALS
HEIGHT: 60 IN | BODY MASS INDEX: 38.48 KG/M2 | RESPIRATION RATE: 18 BRPM | HEART RATE: 79 BPM | DIASTOLIC BLOOD PRESSURE: 90 MMHG | SYSTOLIC BLOOD PRESSURE: 120 MMHG | OXYGEN SATURATION: 98 % | WEIGHT: 196 LBS

## 2018-12-06 DIAGNOSIS — D50.0 IRON DEFICIENCY ANEMIA DUE TO CHRONIC BLOOD LOSS: Primary | ICD-10-CM

## 2018-12-06 PROCEDURE — 99204 OFFICE O/P NEW MOD 45 MIN: CPT | Performed by: PHYSICIAN ASSISTANT

## 2018-12-06 NOTE — PROGRESS NOTES
Hematology/Oncology Outpatient Consult  Curtis Rosario 34 y o  female 1980 [id number]    Date:  12/6/2018      Assessment and Plan:  1  Iron deficiency anemia due to chronic blood loss  68-year-old female presents for consultation regarding iron deficiency anemia  She previously was following with a different hematologist     Patient iron deficiency anemia secondary to menorrhagia secondary to thickened endometrium  She had an endometrial biopsy with GYN and this was negative  She proceeded with uterine ablation last week with Dr Valery Andrade  Most recent CBC is from 11/21/2018  Hemoglobin was 12 7, MCV 88  Remainder of CBC was also normal     Discussed with patient that she likely does not have iron deficiency anemia any more  Also, it is unlikely for her to have recurrence of this at the cause of her iron deficiency was secondary to chronic blood loss from menorrhagia and this has been treated  We did discuss that bleeding can still occur following uterine ablation but hopefully not as severe and will not result in iron deficiency  She will have repeat iron panel at this time  She does still have minimal fatigue  If iron panel shows low levels we will proceed with additional IV iron at this time and then repeat evaluation in 6 months  We will call her with the result of iron panel next week  - Iron Panel; Future      HPI:  68-year-old male presents for consultation regarding iron deficiency anemia  She was following with Dr Miguel A Golden  She is here for 2nd opinion, self-referral      Patient has been receiving Venofer 100 mg with Benadryl 12 5 mg  She was given benadryl and pepcid IV prior  She had allergic reaction from IV pepcid  She states she was receiving Venofer weekly for numerus months  February 2018 ferritin 12 8  Ferritin in March 2018 was 8 7  Periods were every month  Period lasts 7 days  3 days were heavy, wearing pads and tampons and still changing every 1 hour       She follows with Dr Harry Arteaga for GI due to history of gastritis  Most recent colonoscopy  as part of work up for her new iron deficiency anemia  Most recent EGD was in 2018  She takes omeprazole PRN  She will see Dr Harry Arteaga again in 3 years  Patient had genetic testing due to family history of breast cancer (mother and   She was negative for BRCA 1 and BRCA 2  She has bard 1 mutation  ROS: Review of Systems   Constitutional: Positive for fatigue (sometimes, not all the time )  Negative for appetite change and unexpected weight change  Respiratory: Negative for cough and shortness of breath  Cardiovascular: Negative for chest pain, palpitations and leg swelling  Gastrointestinal: Negative for abdominal pain, constipation, diarrhea, nausea and vomiting  Genitourinary: Positive for menstrual problem (see hpi )  Negative for difficulty urinating  Musculoskeletal: Negative for arthralgias  Skin: Negative  Neurological: Negative for dizziness, weakness, light-headedness and headaches  Hematological: Negative for adenopathy  Does not bruise/bleed easily  Psychiatric/Behavioral: Negative          Past Medical History:   Diagnosis Date    Anemia     Arthritis        Past Surgical History:   Procedure Laterality Date    ABDOMINAL SURGERY      2 c-sections    ARTHROSCOPY KNEE Left 2016    Procedure: ARTHROSCOPY KNEE LATERAL aND MEDIAL MENISCECTOMY;  Surgeon: Heidi Keith MD;  [facility] MAIN OR;  Service:      SECTION      CHOLECYSTECTOMY      lap    KNEE ARTHROSCOPY Left     MI HYSTEROSCOPY,W/ENDO BX N/A 2018    Procedure: DILATATION AND CURETTAGE (D&C) WITH HYSTEROSCOPY;  Surgeon: Tiara Wood MD;  Location: [facility] MAIN [facility];  Service: Gynecology    MI HYSTEROSCOPY,W/ENDOMETRIAL ABLATION N/A 2018    Procedure: Chino Macario;  Surgeon: Tiara Wood MD;  Location: [facility] MAIN [facility];  Service: Gynecology       Social History Social History    Marital status: /Civil Union     Spouse name: N/A    Number of children: N/A    Years of education: N/A     Social History Main Topics    Smoking status: Never Smoker    Smokeless tobacco: Never Used    Alcohol use No    Drug use: No    Sexual activity: Yes     Partners: Male     Birth control/ protection: None     Other Topics Concern    None     Social History Narrative    None       Family History   Problem Relation Age of Onset    Hyperlipidemia Mother     Hypertension Mother    Vale Courser Breast cancer Mother     Hypertension Father     Heart disease Father     Breast cancer Maternal Grandmother     Ovarian cancer Paternal Grandmother        Allergies   Allergen Reactions    Other Hives     Shrimp/shellfish    Penicillin G Hives    Pepcid [Famotidine] Chest Pain    Shellfish Allergy Hives and Edema         Current Outpatient Prescriptions:     acetaminophen (TYLENOL) 650 mg CR tablet, Take 1 tablet (650 mg total) by mouth every 8 (eight) hours as needed for mild pain, Disp: 30 tablet, Rfl: 0    omeprazole (PriLOSEC) 20 mg delayed release capsule, As needed, Disp: , Rfl:       Physical Exam:  /90 (BP Location: Left arm, Cuff Size: Large)   Pulse 79   Resp 18   Ht 5' (1 524 m)   Wt 88 9 kg (196 lb)   SpO2 98%   BMI 38 28 kg/m²     Physical Exam   Constitutional: She is oriented to person, place, and time  She appears well-developed and well-nourished  No distress  Overweight    HENT:   Head: Normocephalic and atraumatic  Eyes: Conjunctivae are normal  No scleral icterus  Neck: Normal range of motion  Neck supple  Cardiovascular: Normal rate, regular rhythm and normal heart sounds  No murmur heard  Pulmonary/Chest: Effort normal and breath sounds normal  No respiratory distress  Abdominal: Soft  There is no tenderness  Musculoskeletal: Normal range of motion  She exhibits no edema or tenderness     Lymphadenopathy:     She has no cervical adenopathy  Neurological: She is alert and oriented to person, place, and time  No cranial nerve deficit  Skin: Skin is warm and dry  Psychiatric: She has a normal mood and affect  Vitals reviewed  Patient voiced understanding and agreement in the above discussion  Aware to contact our office with questions/symptoms in the interim

## 2018-12-10 ENCOUNTER — OFFICE VISIT (OUTPATIENT)
Dept: OBGYN CLINIC | Facility: CLINIC | Age: 38
End: 2018-12-10

## 2018-12-10 VITALS
HEIGHT: 60 IN | WEIGHT: 198 LBS | SYSTOLIC BLOOD PRESSURE: 130 MMHG | DIASTOLIC BLOOD PRESSURE: 84 MMHG | BODY MASS INDEX: 38.87 KG/M2

## 2018-12-10 DIAGNOSIS — Z98.890 STATUS POST ENDOMETRIAL ABLATION: Primary | ICD-10-CM

## 2018-12-10 PROCEDURE — 99024 POSTOP FOLLOW-UP VISIT: CPT | Performed by: OBSTETRICS & GYNECOLOGY

## 2018-12-10 NOTE — PROGRESS NOTES
Subjective     Thuy Wong is a 34 y o   female here one week post D&C hysteroscopy Naila Ablation  Patient reports had cramping that night, resolved, tolerating regular diet, void and bowel without difficulty, bowel slow initially  Had hit her breast on day one, checked no hematoma seems to be healing well  Personal health questionnaire reviewed: yes  Gynecologic History  Patient's last menstrual period was 2018  Last Pap:   Results were: normal  Last mammogram:   Results were: normal    Obstetric History  OB History    Para Term  AB Living   2 2           SAB TAB Ectopic Multiple Live Births           2      # Outcome Date GA Lbr Xavier/2nd Weight Sex Delivery Anes PTL Lv   2 Para            1 Para                     The following portions of the patient's history were reviewed and updated as appropriate: allergies, current medications, past family history, past medical history, past social history, past surgical history and problem list     Review of Systems  Review of Systems   Constitutional: Negative for chills, fatigue, fever and unexpected weight change  HENT: Negative for dental problem, sinus pain and sinus pressure  Eyes: Negative for visual disturbance  Respiratory: Negative for cough, shortness of breath and wheezing  Cardiovascular: Negative for chest pain and leg swelling  Gastrointestinal: Negative for constipation, diarrhea, nausea and vomiting  Genitourinary: Negative for menstrual problem, pelvic pain and urgency  Musculoskeletal: Negative for back pain  Skin:        BREAST PAIN     Allergic/Immunologic: Negative for environmental allergies  Neurological: Negative for dizziness and headaches          Objective     /84 (BP Location: Left arm, Patient Position: Sitting, Cuff Size: Large)   Ht 5' (1 524 m)   Wt 89 8 kg (198 lb)   LMP 2018   BMI 38 67 kg/m²      Lungs CTA B  Heart: RRR S1 S2  Abd soft nontender positive bowel sounds    Assessment    35 y/o  one week post d&c hysteroscopy jerzy ablation, steadily recovering     Plan  Return for annual or sooner as needed

## 2018-12-21 ENCOUNTER — TELEPHONE (OUTPATIENT)
Dept: HEMATOLOGY ONCOLOGY | Facility: CLINIC | Age: 38
End: 2018-12-21

## 2018-12-21 ENCOUNTER — TELEPHONE (OUTPATIENT)
Dept: HEMATOLOGY ONCOLOGY | Facility: HOSPITAL | Age: 38
End: 2018-12-21

## 2018-12-21 DIAGNOSIS — D50.8 OTHER IRON DEFICIENCY ANEMIA: Primary | ICD-10-CM

## 2018-12-21 NOTE — TELEPHONE ENCOUNTER
Called patient to review labs  Discussed that I never received the labs from [facility] until today  Iron panel from 12/14/18 showed ferritin 8, iron 39, iron saturation 10%, TIBC 390  Due to this Feraheme 510 mg IV weekly x 2  Please arrange at [facility] on a Thursday  Please mail patient CBC and iron panel to have repeated and move appointment up to 4 month f/u -- April 2019

## 2018-12-26 RX ORDER — SODIUM CHLORIDE 9 MG/ML
20 INJECTION, SOLUTION INTRAVENOUS CONTINUOUS
Status: DISCONTINUED | OUTPATIENT
Start: 2018-12-27 | End: 2018-12-30 | Stop reason: HOSPADM

## 2018-12-27 ENCOUNTER — HOSPITAL ENCOUNTER (OUTPATIENT)
Dept: INFUSION CENTER | Facility: CLINIC | Age: 38
Discharge: HOME | End: 2018-12-27
Payer: COMMERCIAL

## 2018-12-27 VITALS
SYSTOLIC BLOOD PRESSURE: 106 MMHG | DIASTOLIC BLOOD PRESSURE: 80 MMHG | TEMPERATURE: 98.6 F | HEART RATE: 80 BPM | RESPIRATION RATE: 20 BRPM | OXYGEN SATURATION: 98 %

## 2018-12-27 PROCEDURE — 96365 THER/PROPH/DIAG IV INF INIT: CPT

## 2018-12-27 RX ADMIN — FERUMOXYTOL 510 MG: 510 INJECTION INTRAVENOUS at 13:32

## 2018-12-27 RX ADMIN — SODIUM CHLORIDE 20 ML/HR: 0.9 INJECTION, SOLUTION INTRAVENOUS at 13:20

## 2019-01-02 RX ORDER — SODIUM CHLORIDE 9 MG/ML
20 INJECTION, SOLUTION INTRAVENOUS CONTINUOUS
Status: DISCONTINUED | OUTPATIENT
Start: 2019-01-03 | End: 2019-01-06 | Stop reason: HOSPADM

## 2019-01-03 ENCOUNTER — HOSPITAL ENCOUNTER (OUTPATIENT)
Dept: INFUSION CENTER | Facility: CLINIC | Age: 39
Discharge: HOME | End: 2019-01-03
Payer: COMMERCIAL

## 2019-01-03 VITALS
TEMPERATURE: 98.4 F | HEART RATE: 77 BPM | SYSTOLIC BLOOD PRESSURE: 127 MMHG | DIASTOLIC BLOOD PRESSURE: 67 MMHG | RESPIRATION RATE: 18 BRPM

## 2019-01-03 PROCEDURE — 96365 THER/PROPH/DIAG IV INF INIT: CPT

## 2019-01-03 RX ADMIN — SODIUM CHLORIDE 20 ML/HR: 0.9 INJECTION, SOLUTION INTRAVENOUS at 12:30

## 2019-01-03 RX ADMIN — FERUMOXYTOL 510 MG: 510 INJECTION INTRAVENOUS at 12:48

## 2019-04-10 ENCOUNTER — LAB (OUTPATIENT)
Dept: LAB | Facility: CLINIC | Age: 39
End: 2019-04-10
Payer: COMMERCIAL

## 2019-04-10 ENCOUNTER — TRANSCRIBE ORDERS (OUTPATIENT)
Dept: LAB | Facility: CLINIC | Age: 39
End: 2019-04-10

## 2019-04-10 DIAGNOSIS — D50.8 OTHER IRON DEFICIENCY ANEMIA: ICD-10-CM

## 2019-04-10 LAB
BASOPHILS # BLD AUTO: 0.03 THOUSANDS/ΜL (ref 0–0.1)
BASOPHILS NFR BLD AUTO: 0 % (ref 0–1)
EOSINOPHIL # BLD AUTO: 0.35 THOUSAND/ΜL (ref 0–0.61)
EOSINOPHIL NFR BLD AUTO: 4 % (ref 0–6)
ERYTHROCYTE [DISTWIDTH] IN BLOOD BY AUTOMATED COUNT: 12.6 % (ref 11.6–15.1)
FERRITIN SERPL-MCNC: 136 NG/ML (ref 8–388)
HCT VFR BLD AUTO: 38.9 % (ref 34.8–46.1)
HGB BLD-MCNC: 13.2 G/DL (ref 11.5–15.4)
IMM GRANULOCYTES # BLD AUTO: 0.1 THOUSAND/UL (ref 0–0.2)
IMM GRANULOCYTES NFR BLD AUTO: 1 % (ref 0–2)
IRON SATN MFR SERPL: 24 %
IRON SERPL-MCNC: 65 UG/DL (ref 50–170)
LYMPHOCYTES # BLD AUTO: 1.9 THOUSANDS/ΜL (ref 0.6–4.47)
LYMPHOCYTES NFR BLD AUTO: 22 % (ref 14–44)
MCH RBC QN AUTO: 31.8 PG (ref 26.8–34.3)
MCHC RBC AUTO-ENTMCNC: 33.9 G/DL (ref 31.4–37.4)
MCV RBC AUTO: 94 FL (ref 82–98)
MONOCYTES # BLD AUTO: 0.58 THOUSAND/ΜL (ref 0.17–1.22)
MONOCYTES NFR BLD AUTO: 7 % (ref 4–12)
NEUTROPHILS # BLD AUTO: 5.86 THOUSANDS/ΜL (ref 1.85–7.62)
NEUTS SEG NFR BLD AUTO: 66 % (ref 43–75)
NRBC BLD AUTO-RTO: 0 /100 WBCS
PLATELET # BLD AUTO: 243 THOUSANDS/UL (ref 149–390)
PMV BLD AUTO: 10.5 FL (ref 8.9–12.7)
RBC # BLD AUTO: 4.15 MILLION/UL (ref 3.81–5.12)
TIBC SERPL-MCNC: 271 UG/DL (ref 250–450)
WBC # BLD AUTO: 8.82 THOUSAND/UL (ref 4.31–10.16)

## 2019-04-10 PROCEDURE — 82728 ASSAY OF FERRITIN: CPT

## 2019-04-10 PROCEDURE — 85025 COMPLETE CBC W/AUTO DIFF WBC: CPT | Performed by: PHYSICIAN ASSISTANT

## 2019-04-10 PROCEDURE — 83550 IRON BINDING TEST: CPT

## 2019-04-10 PROCEDURE — 83540 ASSAY OF IRON: CPT

## 2019-04-10 PROCEDURE — 36415 COLL VENOUS BLD VENIPUNCTURE: CPT | Performed by: PHYSICIAN ASSISTANT

## 2019-04-18 ENCOUNTER — OFFICE VISIT (OUTPATIENT)
Dept: HEMATOLOGY ONCOLOGY | Facility: CLINIC | Age: 39
End: 2019-04-18
Payer: COMMERCIAL

## 2019-04-18 VITALS
RESPIRATION RATE: 16 BRPM | WEIGHT: 200 LBS | TEMPERATURE: 98.1 F | SYSTOLIC BLOOD PRESSURE: 130 MMHG | HEIGHT: 60 IN | DIASTOLIC BLOOD PRESSURE: 80 MMHG | BODY MASS INDEX: 39.27 KG/M2 | HEART RATE: 101 BPM

## 2019-04-18 DIAGNOSIS — D50.0 IRON DEFICIENCY ANEMIA DUE TO CHRONIC BLOOD LOSS: Primary | ICD-10-CM

## 2019-04-18 PROCEDURE — 99213 OFFICE O/P EST LOW 20 MIN: CPT | Performed by: PHYSICIAN ASSISTANT

## 2019-05-09 ENCOUNTER — HOSPITAL ENCOUNTER (EMERGENCY)
Facility: HOSPITAL | Age: 39
Discharge: HOME | End: 2019-05-09
Attending: EMERGENCY MEDICINE
Payer: COMMERCIAL

## 2019-05-09 ENCOUNTER — APPOINTMENT (EMERGENCY)
Dept: CT IMAGING | Facility: HOSPITAL | Age: 39
End: 2019-05-09
Payer: COMMERCIAL

## 2019-05-09 VITALS
DIASTOLIC BLOOD PRESSURE: 86 MMHG | RESPIRATION RATE: 18 BRPM | WEIGHT: 195 LBS | SYSTOLIC BLOOD PRESSURE: 154 MMHG | BODY MASS INDEX: 38.08 KG/M2 | OXYGEN SATURATION: 98 % | HEART RATE: 86 BPM | TEMPERATURE: 98.3 F

## 2019-05-09 DIAGNOSIS — R10.9 FLANK PAIN: Primary | ICD-10-CM

## 2019-05-09 LAB
ALBUMIN SERPL BCP-MCNC: 4.1 G/DL (ref 3.5–5)
ALP SERPL-CCNC: 73 U/L (ref 46–116)
ALT SERPL W P-5'-P-CCNC: 23 U/L (ref 12–78)
ANION GAP SERPL CALCULATED.3IONS-SCNC: 10 MMOL/L (ref 4–13)
AST SERPL W P-5'-P-CCNC: 18 U/L (ref 5–45)
BACTERIA UR QL AUTO: ABNORMAL /HPF
BASOPHILS # BLD AUTO: 0.03 THOUSANDS/ΜL (ref 0–0.1)
BASOPHILS NFR BLD AUTO: 0 % (ref 0–1)
BILIRUB SERPL-MCNC: 1 MG/DL (ref 0.2–1)
BILIRUB UR QL STRIP: NEGATIVE
BUN SERPL-MCNC: 19 MG/DL (ref 5–25)
CALCIUM SERPL-MCNC: 9.1 MG/DL (ref 8.3–10.1)
CHLORIDE SERPL-SCNC: 103 MMOL/L (ref 100–108)
CLARITY UR: CLEAR
CO2 SERPL-SCNC: 28 MMOL/L (ref 21–32)
COLOR UR: YELLOW
CREAT SERPL-MCNC: 0.86 MG/DL (ref 0.6–1.3)
EOSINOPHIL # BLD AUTO: 0.19 THOUSAND/ΜL (ref 0–0.61)
EOSINOPHIL NFR BLD AUTO: 2 % (ref 0–6)
ERYTHROCYTE [DISTWIDTH] IN BLOOD BY AUTOMATED COUNT: 11.9 % (ref 11.6–15.1)
EXT PREG TEST URINE: NEGATIVE
GFR SERPL CREATININE-BSD FRML MDRD: 85 ML/MIN/1.73SQ M
GLUCOSE SERPL-MCNC: 103 MG/DL (ref 65–140)
GLUCOSE UR STRIP-MCNC: NEGATIVE MG/DL
HCT VFR BLD AUTO: 41.1 % (ref 34.8–46.1)
HGB BLD-MCNC: 14 G/DL (ref 11.5–15.4)
HGB UR QL STRIP.AUTO: ABNORMAL
IMM GRANULOCYTES # BLD AUTO: 0.07 THOUSAND/UL (ref 0–0.2)
IMM GRANULOCYTES NFR BLD AUTO: 1 % (ref 0–2)
KETONES UR STRIP-MCNC: NEGATIVE MG/DL
LEUKOCYTE ESTERASE UR QL STRIP: NEGATIVE
LIPASE SERPL-CCNC: 241 U/L (ref 73–393)
LYMPHOCYTES # BLD AUTO: 2.14 THOUSANDS/ΜL (ref 0.6–4.47)
LYMPHOCYTES NFR BLD AUTO: 25 % (ref 14–44)
MCH RBC QN AUTO: 32.5 PG (ref 26.8–34.3)
MCHC RBC AUTO-ENTMCNC: 34.1 G/DL (ref 31.4–37.4)
MCV RBC AUTO: 95 FL (ref 82–98)
MONOCYTES # BLD AUTO: 0.52 THOUSAND/ΜL (ref 0.17–1.22)
MONOCYTES NFR BLD AUTO: 6 % (ref 4–12)
NEUTROPHILS # BLD AUTO: 5.71 THOUSANDS/ΜL (ref 1.85–7.62)
NEUTS SEG NFR BLD AUTO: 66 % (ref 43–75)
NITRITE UR QL STRIP: NEGATIVE
NON-SQ EPI CELLS URNS QL MICRO: ABNORMAL /HPF
NRBC BLD AUTO-RTO: 0 /100 WBCS
PH UR STRIP.AUTO: 5.5 [PH] (ref 4.5–8)
PLATELET # BLD AUTO: 261 THOUSANDS/UL (ref 149–390)
PMV BLD AUTO: 10.1 FL (ref 8.9–12.7)
POTASSIUM SERPL-SCNC: 3.6 MMOL/L (ref 3.5–5.3)
PROT SERPL-MCNC: 7.8 G/DL (ref 6.4–8.2)
PROT UR STRIP-MCNC: NEGATIVE MG/DL
RBC # BLD AUTO: 4.31 MILLION/UL (ref 3.81–5.12)
RBC #/AREA URNS AUTO: ABNORMAL /HPF
SODIUM SERPL-SCNC: 141 MMOL/L (ref 136–145)
SP GR UR STRIP.AUTO: 1.02 (ref 1–1.03)
UROBILINOGEN UR QL STRIP.AUTO: 0.2 E.U./DL
WBC # BLD AUTO: 8.66 THOUSAND/UL (ref 4.31–10.16)
WBC #/AREA URNS AUTO: ABNORMAL /HPF

## 2019-05-09 PROCEDURE — 96374 THER/PROPH/DIAG INJ IV PUSH: CPT

## 2019-05-09 PROCEDURE — 99284 EMERGENCY DEPT VISIT MOD MDM: CPT

## 2019-05-09 PROCEDURE — 85025 COMPLETE CBC W/AUTO DIFF WBC: CPT | Performed by: PHYSICIAN ASSISTANT

## 2019-05-09 PROCEDURE — 83690 ASSAY OF LIPASE: CPT | Performed by: PHYSICIAN ASSISTANT

## 2019-05-09 PROCEDURE — 81001 URINALYSIS AUTO W/SCOPE: CPT

## 2019-05-09 PROCEDURE — 36415 COLL VENOUS BLD VENIPUNCTURE: CPT | Performed by: PHYSICIAN ASSISTANT

## 2019-05-09 PROCEDURE — 81025 URINE PREGNANCY TEST: CPT | Performed by: PHYSICIAN ASSISTANT

## 2019-05-09 PROCEDURE — 99284 EMERGENCY DEPT VISIT MOD MDM: CPT | Performed by: PHYSICIAN ASSISTANT

## 2019-05-09 PROCEDURE — 96361 HYDRATE IV INFUSION ADD-ON: CPT

## 2019-05-09 PROCEDURE — 74176 CT ABD & PELVIS W/O CONTRAST: CPT

## 2019-05-09 PROCEDURE — 80053 COMPREHEN METABOLIC PANEL: CPT | Performed by: PHYSICIAN ASSISTANT

## 2019-05-09 RX ORDER — KETOROLAC TROMETHAMINE 30 MG/ML
15 INJECTION, SOLUTION INTRAMUSCULAR; INTRAVENOUS ONCE
Status: COMPLETED | OUTPATIENT
Start: 2019-05-09 | End: 2019-05-09

## 2019-05-09 RX ADMIN — SODIUM CHLORIDE 1000 ML: 0.9 INJECTION, SOLUTION INTRAVENOUS at 03:02

## 2019-05-09 RX ADMIN — KETOROLAC TROMETHAMINE 15 MG: 30 INJECTION, SOLUTION INTRAMUSCULAR at 04:44

## 2020-01-29 ENCOUNTER — APPOINTMENT (OUTPATIENT)
Dept: LAB | Facility: CLINIC | Age: 40
End: 2020-01-29
Payer: COMMERCIAL

## 2020-01-29 ENCOUNTER — TRANSCRIBE ORDERS (OUTPATIENT)
Dept: LAB | Facility: CLINIC | Age: 40
End: 2020-01-29

## 2020-01-29 DIAGNOSIS — D50.0 IRON DEFICIENCY ANEMIA DUE TO CHRONIC BLOOD LOSS: ICD-10-CM

## 2020-01-29 LAB
BASOPHILS # BLD AUTO: 0.02 THOUSANDS/ΜL (ref 0–0.1)
BASOPHILS NFR BLD AUTO: 0 % (ref 0–1)
EOSINOPHIL # BLD AUTO: 0.15 THOUSAND/ΜL (ref 0–0.61)
EOSINOPHIL NFR BLD AUTO: 2 % (ref 0–6)
ERYTHROCYTE [DISTWIDTH] IN BLOOD BY AUTOMATED COUNT: 12.2 % (ref 11.6–15.1)
FERRITIN SERPL-MCNC: 80 NG/ML (ref 8–388)
HCT VFR BLD AUTO: 40.2 % (ref 34.8–46.1)
HGB BLD-MCNC: 13.4 G/DL (ref 11.5–15.4)
IMM GRANULOCYTES # BLD AUTO: 0.08 THOUSAND/UL (ref 0–0.2)
IMM GRANULOCYTES NFR BLD AUTO: 1 % (ref 0–2)
IRON SATN MFR SERPL: 18 %
IRON SERPL-MCNC: 59 UG/DL (ref 50–170)
LYMPHOCYTES # BLD AUTO: 1.77 THOUSANDS/ΜL (ref 0.6–4.47)
LYMPHOCYTES NFR BLD AUTO: 21 % (ref 14–44)
MCH RBC QN AUTO: 31.5 PG (ref 26.8–34.3)
MCHC RBC AUTO-ENTMCNC: 33.3 G/DL (ref 31.4–37.4)
MCV RBC AUTO: 94 FL (ref 82–98)
MONOCYTES # BLD AUTO: 0.5 THOUSAND/ΜL (ref 0.17–1.22)
MONOCYTES NFR BLD AUTO: 6 % (ref 4–12)
NEUTROPHILS # BLD AUTO: 5.78 THOUSANDS/ΜL (ref 1.85–7.62)
NEUTS SEG NFR BLD AUTO: 70 % (ref 43–75)
NRBC BLD AUTO-RTO: 0 /100 WBCS
PLATELET # BLD AUTO: 250 THOUSANDS/UL (ref 149–390)
PMV BLD AUTO: 10.7 FL (ref 8.9–12.7)
RBC # BLD AUTO: 4.26 MILLION/UL (ref 3.81–5.12)
TIBC SERPL-MCNC: 320 UG/DL (ref 250–450)
WBC # BLD AUTO: 8.3 THOUSAND/UL (ref 4.31–10.16)

## 2020-01-29 PROCEDURE — 36415 COLL VENOUS BLD VENIPUNCTURE: CPT | Performed by: PHYSICIAN ASSISTANT

## 2020-01-29 PROCEDURE — 85025 COMPLETE CBC W/AUTO DIFF WBC: CPT | Performed by: PHYSICIAN ASSISTANT

## 2020-01-29 PROCEDURE — 82728 ASSAY OF FERRITIN: CPT

## 2020-01-29 PROCEDURE — 83540 ASSAY OF IRON: CPT

## 2020-01-29 PROCEDURE — 83550 IRON BINDING TEST: CPT

## 2020-01-29 NOTE — PROGRESS NOTES
Assessment/Plan   Diagnoses and all orders for this visit:    Gynecologic exam normal    Breast cancer screening  -     Mammo screening bilateral w 3d & cad; Future    Family history of breast cancer  -     Ambulatory referral to Surgical Oncology; Future        Discussion    All questions have been answered to her satisfaction  RTO for APE or sooner if needed      Subjective     Pt for annual GYN exam  S/p endometrial ablation for menorrhagia  Periods great since procedure  Will only last 1-2 days  Does have increase increase in bloating associated w her menses,  but otherwise no concerns  No vaginal complaints  No problems w IC  Pt did test positive previously for the BARD 1 gene after genetic testing was done  Mom and MGM both  from breast cancer  Pt and her sister both positive for the gene  She was told there was unknown significance for it so no further tx was advised at that time but pt does question if a prophylactic mastectomy is something that should be further considered  Advised to f/u with Dr Gertrudis Daigle to discuss best treatment options and up to date recommendations  Rachel Boyle is a 37 y o  female who presents for annual well woman exam    LMP -20  No vulvar itch/burn; No vaginal itch/burn; No abn discharge or odor; No urinary sx - burning/pain/frequency/hematuria  (+) SBEs - no breast masses, asymmetry, nipple discharge or bleeding, changes in skin of breast, or breast tenderness bilaterally  No abd/pelvic pain or HAs; No menopausal symptoms: No hot flashes/night sweats, problems with intercourse, vaginal dryness; sleeping well  Pt is sexually active in a mutually monog/ sexual relationship; No issues with intercourse;  She declines std/hiv/hep testing; Feels safe at home    (+) PCP for routine Bw/care- Dr Zia Mckenna    Last Pap - 18 WNL neg HPV  History of abnormal Pap smear: WNL   Last mammo - 19 WNL   History of abnormal mammogram: never      Review of Systems Constitutional: Negative  Respiratory: Negative  Gastrointestinal: Negative  Endocrine: Negative  Genitourinary: Negative          The following portions of the patient's history were reviewed and updated as appropriate: allergies, current medications, past family history, past medical history, past social history, past surgical history and problem list          OB History        2    Para   2    Term                AB        Living           SAB        TAB        Ectopic        Multiple        Live Births   2                 Past Medical History:   Diagnosis Date    Anemia     Arthritis        Past Surgical History:   Procedure Laterality Date    ABDOMINAL SURGERY      2 c-sections    ARTHROSCOPY KNEE Left 2016    Procedure: ARTHROSCOPY KNEE LATERAL aND MEDIAL MENISCECTOMY;  Surgeon: Becki Schneider MD;  Location: [place] [address] MAIN OR;  Service:    [address] [place] [address] A      lap    KNEE ARTHROSCOPY Left     [place] HYSTEROSCOPY,W/ENDO BX N/A 2018    Procedure: DILATATION AND CURETTAGE (D&C) WITH HYSTEROSCOPY;  Surgeon: Ernesto Baker MD;  [facility];  Service: Gynecology    IL HYSTEROSCOPY,W/ENDOMETRIAL ABLATION N/A 2018    Procedure: ABLATION ENDOMETRIAL ENEDELIA;  Surgeon: Ernesto Baker MD;  [facility];  Service: Gynecology       Family History   Problem Relation Age of Onset    Hyperlipidemia Mother     Hypertension Mother     Breast cancer Mother     Hypertension Father     Heart disease Father     Breast cancer Maternal Grandmother     Ovarian cancer Paternal Grandmother        Social History     Socioeconomic History    Marital status: /Civil Union     Spouse name: Not on file    Number of children: Not on file    Years of education: Not on file    Highest education level: Not on file   Occupational History    Not on file   Social Needs    Financial resource strain: Not on file    Food insecurity: Worry: Not on file     Inability: Not on file    Transportation needs:     Medical: Not on file     Non-medical: Not on file   Tobacco Use    Smoking status: Never Smoker    Smokeless tobacco: Never Used   Substance and Sexual Activity    Alcohol use: No    Drug use: No    Sexual activity: Yes     Partners: Male     Birth control/protection: None   Lifestyle    Physical activity:     Days per week: Not on file     Minutes per session: Not on file    Stress: Not on file   Relationships    Social connections:     Talks on phone: Not on file     Gets together: Not on file     Attends Latter-day service: Not on file     Active member of club or organization: Not on file     Attends meetings of clubs or organizations: Not on file     Relationship status: Not on file    Intimate partner violence:     Fear of current or ex partner: Not on file     Emotionally abused: Not on file     Physically abused: Not on file     Forced sexual activity: Not on file   Other Topics Concern    Not on file   Social History Narrative    Not on file         Current Outpatient Medications:     acetaminophen (TYLENOL) 650 mg CR tablet, Take 1 tablet (650 mg total) by mouth every 8 (eight) hours as needed for mild pain (Patient not taking: Reported on 5/9/2019), Disp: 30 tablet, Rfl: 0    omeprazole (PriLOSEC) 20 mg delayed release capsule, As needed, Disp: , Rfl:     Allergies   Allergen Reactions    Other Hives     Shrimp/shellfish    Penicillin G Hives    Pepcid [Famotidine] Chest Pain    Shellfish Allergy Hives and Edema       Objective   Vitals:    02/01/20 0754   BP: 134/82   BP Location: Left arm   Patient Position: Sitting   Cuff Size: Standard   Weight: 88 5 kg (195 lb)   Height: 5' (1 524 m)     Physical Exam   Constitutional: She is oriented to person, place, and time  She appears well-developed and well-nourished  HENT:   Head: Normocephalic and atraumatic  Cardiovascular: Normal rate and regular rhythm  Pulmonary/Chest: Effort normal and breath sounds normal  Right breast exhibits no inverted nipple, no mass, no nipple discharge, no skin change and no tenderness  Left breast exhibits no inverted nipple, no mass, no nipple discharge, no skin change and no tenderness  Breasts are symmetrical    Abdominal: Soft  She exhibits no distension and no mass  There is no rebound and no guarding  Genitourinary: Vagina normal and uterus normal  No vaginal discharge found  Neurological: She is alert and oriented to person, place, and time  Skin: Skin is warm and dry  Psychiatric: She has a normal mood and affect  Patient Instructions   Pap deferred due to guidelines  Mammo ordered for June 2020  Pt questions possible prophylactic mastectomy b/l  Info given Dr Asya Gupta  Will plan consult

## 2020-02-01 ENCOUNTER — ANNUAL EXAM (OUTPATIENT)
Dept: OBGYN CLINIC | Facility: CLINIC | Age: 40
End: 2020-02-01
Payer: COMMERCIAL

## 2020-02-01 VITALS
HEIGHT: 60 IN | SYSTOLIC BLOOD PRESSURE: 134 MMHG | BODY MASS INDEX: 38.28 KG/M2 | WEIGHT: 195 LBS | DIASTOLIC BLOOD PRESSURE: 82 MMHG

## 2020-02-01 DIAGNOSIS — Z01.419 GYNECOLOGIC EXAM NORMAL: Primary | ICD-10-CM

## 2020-02-01 DIAGNOSIS — Z80.3 FAMILY HISTORY OF BREAST CANCER: ICD-10-CM

## 2020-02-01 DIAGNOSIS — Z12.39 BREAST CANCER SCREENING: ICD-10-CM

## 2020-02-01 PROCEDURE — S0612 ANNUAL GYNECOLOGICAL EXAMINA: HCPCS | Performed by: PHYSICIAN ASSISTANT

## 2020-02-01 NOTE — PATIENT INSTRUCTIONS
Pap deferred due to guidelines  Mammo ordered for June 2020  Pt questions possible prophylactic mastectomy b/l  Info given Dr Arsen Cox  Will plan consult

## 2020-06-08 ENCOUNTER — TRANSCRIBE ORDERS (OUTPATIENT)
Dept: LAB | Facility: CLINIC | Age: 40
End: 2020-06-08

## 2020-06-08 ENCOUNTER — APPOINTMENT (OUTPATIENT)
Dept: LAB | Facility: CLINIC | Age: 40
End: 2020-06-08
Payer: COMMERCIAL

## 2020-06-08 DIAGNOSIS — R25.2 CRAMP OF LIMB: ICD-10-CM

## 2020-06-08 DIAGNOSIS — R20.0 TACTILE ANESTHESIA: ICD-10-CM

## 2020-06-08 DIAGNOSIS — R20.2 PARESTHESIA: ICD-10-CM

## 2020-06-08 DIAGNOSIS — R20.2 PARESTHESIA: Primary | ICD-10-CM

## 2020-06-08 DIAGNOSIS — Z00.00 ROUTINE GENERAL MEDICAL EXAMINATION AT A HEALTH CARE FACILITY: ICD-10-CM

## 2020-06-08 LAB
ALBUMIN SERPL BCP-MCNC: 4 G/DL (ref 3.5–5)
ALP SERPL-CCNC: 75 U/L (ref 46–116)
ALT SERPL W P-5'-P-CCNC: 15 U/L (ref 12–78)
ANION GAP SERPL CALCULATED.3IONS-SCNC: 6 MMOL/L (ref 4–13)
AST SERPL W P-5'-P-CCNC: 11 U/L (ref 5–45)
BACTERIA UR QL AUTO: ABNORMAL /HPF
BASOPHILS # BLD AUTO: 0.01 THOUSANDS/ΜL (ref 0–0.1)
BASOPHILS NFR BLD AUTO: 0 % (ref 0–1)
BILIRUB SERPL-MCNC: 0.91 MG/DL (ref 0.2–1)
BILIRUB UR QL STRIP: NEGATIVE
BUN SERPL-MCNC: 13 MG/DL (ref 5–25)
CALCIUM SERPL-MCNC: 8.7 MG/DL (ref 8.3–10.1)
CHLORIDE SERPL-SCNC: 103 MMOL/L (ref 100–108)
CHOLEST SERPL-MCNC: 228 MG/DL (ref 50–200)
CLARITY UR: ABNORMAL
CO2 SERPL-SCNC: 27 MMOL/L (ref 21–32)
COLOR UR: YELLOW
CREAT SERPL-MCNC: 0.87 MG/DL (ref 0.6–1.3)
EOSINOPHIL # BLD AUTO: 0.13 THOUSAND/ΜL (ref 0–0.61)
EOSINOPHIL NFR BLD AUTO: 2 % (ref 0–6)
ERYTHROCYTE [DISTWIDTH] IN BLOOD BY AUTOMATED COUNT: 12.1 % (ref 11.6–15.1)
FERRITIN SERPL-MCNC: 65 NG/ML (ref 8–388)
GFR SERPL CREATININE-BSD FRML MDRD: 83 ML/MIN/1.73SQ M
GLUCOSE P FAST SERPL-MCNC: 98 MG/DL (ref 65–99)
GLUCOSE UR STRIP-MCNC: NEGATIVE MG/DL
HCT VFR BLD AUTO: 43.5 % (ref 34.8–46.1)
HDLC SERPL-MCNC: 39 MG/DL
HGB BLD-MCNC: 14.3 G/DL (ref 11.5–15.4)
HGB UR QL STRIP.AUTO: ABNORMAL
IMM GRANULOCYTES # BLD AUTO: 0.05 THOUSAND/UL (ref 0–0.2)
IMM GRANULOCYTES NFR BLD AUTO: 1 % (ref 0–2)
KETONES UR STRIP-MCNC: NEGATIVE MG/DL
LDLC SERPL CALC-MCNC: 135 MG/DL (ref 0–100)
LEUKOCYTE ESTERASE UR QL STRIP: NEGATIVE
LYMPHOCYTES # BLD AUTO: 1.71 THOUSANDS/ΜL (ref 0.6–4.47)
LYMPHOCYTES NFR BLD AUTO: 24 % (ref 14–44)
MCH RBC QN AUTO: 31.1 PG (ref 26.8–34.3)
MCHC RBC AUTO-ENTMCNC: 32.9 G/DL (ref 31.4–37.4)
MCV RBC AUTO: 95 FL (ref 82–98)
MONOCYTES # BLD AUTO: 0.36 THOUSAND/ΜL (ref 0.17–1.22)
MONOCYTES NFR BLD AUTO: 5 % (ref 4–12)
NEUTROPHILS # BLD AUTO: 4.91 THOUSANDS/ΜL (ref 1.85–7.62)
NEUTS SEG NFR BLD AUTO: 68 % (ref 43–75)
NITRITE UR QL STRIP: NEGATIVE
NON-SQ EPI CELLS URNS QL MICRO: ABNORMAL /HPF
NONHDLC SERPL-MCNC: 189 MG/DL
NRBC BLD AUTO-RTO: 0 /100 WBCS
PH UR STRIP.AUTO: 5.5 [PH]
PLATELET # BLD AUTO: 241 THOUSANDS/UL (ref 149–390)
PMV BLD AUTO: 10.6 FL (ref 8.9–12.7)
POTASSIUM SERPL-SCNC: 3.9 MMOL/L (ref 3.5–5.3)
PROT SERPL-MCNC: 7.6 G/DL (ref 6.4–8.2)
PROT UR STRIP-MCNC: NEGATIVE MG/DL
RBC # BLD AUTO: 4.6 MILLION/UL (ref 3.81–5.12)
RBC #/AREA URNS AUTO: ABNORMAL /HPF
SODIUM SERPL-SCNC: 136 MMOL/L (ref 136–145)
SP GR UR STRIP.AUTO: >=1.03 (ref 1–1.03)
TRIGL SERPL-MCNC: 270 MG/DL
TSH SERPL DL<=0.05 MIU/L-ACNC: 1.58 UIU/ML (ref 0.36–3.74)
UROBILINOGEN UR QL STRIP.AUTO: 0.2 E.U./DL
VIT B12 SERPL-MCNC: 373 PG/ML (ref 100–900)
WBC # BLD AUTO: 7.17 THOUSAND/UL (ref 4.31–10.16)
WBC #/AREA URNS AUTO: ABNORMAL /HPF

## 2020-06-08 PROCEDURE — 85025 COMPLETE CBC W/AUTO DIFF WBC: CPT

## 2020-06-08 PROCEDURE — 80061 LIPID PANEL: CPT

## 2020-06-08 PROCEDURE — 80053 COMPREHEN METABOLIC PANEL: CPT

## 2020-06-08 PROCEDURE — 82607 VITAMIN B-12: CPT

## 2020-06-08 PROCEDURE — 81001 URINALYSIS AUTO W/SCOPE: CPT | Performed by: INTERNAL MEDICINE

## 2020-06-08 PROCEDURE — 36415 COLL VENOUS BLD VENIPUNCTURE: CPT

## 2020-06-08 PROCEDURE — 82728 ASSAY OF FERRITIN: CPT

## 2020-06-08 PROCEDURE — 84443 ASSAY THYROID STIM HORMONE: CPT

## 2020-06-24 DIAGNOSIS — Z12.39 BREAST CANCER SCREENING: ICD-10-CM

## 2021-03-03 ENCOUNTER — TELEPHONE (OUTPATIENT)
Dept: GASTROENTEROLOGY | Facility: CLINIC | Age: 41
End: 2021-03-03

## 2021-03-03 NOTE — TELEPHONE ENCOUNTER
Patient had left message asking when her next colonoscopy was due  I checked her [facility] chart and she had one in 2/2015 at age 37 for change in bowels  Dr Severo Cocks didn't say she needed to repeat  I returned her call and had to leave message that he didn't put an order in to repeat at anytime but if she were having problems or any family hx of colon cancer, to call back and I would have to ask Dr Severo Cocks  Also left message that she is due for an EGD in September of this year

## 2021-03-03 NOTE — TELEPHONE ENCOUNTER
Patient returned call  She has no family history of colon ca but her mother had about 17 polyps removed once and she thought Dr Scooter King removed one from her at age 37  Last colon in 2015, thought you said 5 years  No order or recall in [facility] to repeat colon, however she is due for EGD in September 2021  If she is to have another colon, said she would wait until September to do both  Please advise if patient is to repeat colon  Thank you!

## 2021-05-17 NOTE — PROGRESS NOTES
Assessment/Plan   Diagnoses and all orders for this visit:    Well woman exam    Encounter for screening mammogram for malignant neoplasm of breast  -     Mammo screening bilateral w 3d & cad; Future    Other orders  -     Prenatal Vit-Fe Fumarate-FA (PRENATAL 1+1 PO); Take by mouth  -     Ascorbic Acid (vitamin C) 100 MG tablet; Take 100 mg by mouth daily  -     cholecalciferol (VITAMIN D3) 400 units tablet; Take 400 Units by mouth daily        Discussion    All questions have been answered to her satisfaction  RTO for APE or sooner if needed      Subjective     HPI   Magy Hagen is a 41 y o  female who presents for annual well woman exam    LMP - 19 s/p ablation due to h/o menorrhagia  Periods have been slightly heavier more recently, but not anywhere near as bad as pre ablation  No vulvar itch/burn; No vaginal itch/burn; No abn discharge or odor; No urinary sx - burning/pain/frequency/hematuria    (+) SBEs - no breast masses, asymmetry, nipple discharge or bleeding, changes in skin of breast, or breast tenderness bilaterally  No abd/pelvic pain or HAs; No menopausal symptoms: No hot flashes/night sweats, problems with intercourse, vaginal dryness; sleeping well  Pt is sexually active in a mutually monog/ sexual relationship; No issues with intercourse; She declines std/hiv/hep testing; Feels safe at home      (+) PCP for routine Bw/care; Last Pap - 18 WNL neg HPV  Last mammo - 20 BIRADS 2       Review of Systems   Constitutional: Negative  Respiratory: Negative  Gastrointestinal: Negative  Endocrine: Negative  Genitourinary: Negative          The following portions of the patient's history were reviewed and updated as appropriate: allergies, current medications, past family history, past medical history, past social history, past surgical history and problem list          OB History        2    Para   2    Term                AB        Living SAB        TAB        Ectopic        Multiple        Live Births   2                 Past Medical History:   Diagnosis Date    Anemia     Arthritis     Menorrhagia with regular cycle 11/20/2018    Added automatically from request for surgery [id number]       Past Surgical History:   Procedure Laterality Date    ABDOMINAL SURGERY      2 c-sections    ARTHROSCOPY KNEE Left 12/30/2016    Procedure: ARTHROSCOPY KNEE LATERAL aND MEDIAL MENISCECTOMY;  Surgeon: Amaya Lunsford MD;  Location: [facility] MAIN OR;  Service:     [facility]      CHOLECYSTECTOMY  2007    lap    KNEE ARTHROSCOPY Left 2007    MA HYSTEROSCOPY,W/ENDO BX N/A 11/29/2018    Procedure: DILATATION AND CURETTAGE (D&C) WITH HYSTEROSCOPY;  Surgeon: Anderson Chavez MD;  Location:  MAIN OR;  Service: Gynecology    MA HYSTEROSCOPY,W/ENDOMETRIAL ABLATION N/A 11/29/2018    Procedure: ABLATION ENDOMETRIAL ENEDELIA;  Surgeon: Anderson Chvaez MD;  [facility];  Service: Gynecology       Family History   Problem Relation Age of Onset    Hyperlipidemia Mother     Hypertension Mother    [place] Breast cancer Mother     Hypertension Father     Heart disease Father     Breast cancer Maternal Grandmother     Ovarian cancer Paternal Grandmother        Social History     Socioeconomic History    Marital status: /Civil Union     Spouse name: Not on file    Number of children: Not on file    Years of education: Not on file    Highest education level: Not on file   Occupational History    Not on file   Social Needs    Financial resource strain: Not on file    Food insecurity     Worry: Not on file     Inability: Not on file   [employer] needs     Medical: Not on file     Non-medical: Not on file   Tobacco Use    Smoking status: Never Smoker    Smokeless tobacco: Never Used   Substance and Sexual Activity    Alcohol use: No    Drug use: No    Sexual activity: Yes     Partners: Male     Birth control/protection: None Lifestyle    Physical activity     Days per week: Not on file     Minutes per session: Not on file    Stress: Not on file   Relationships    Social connections     Talks on phone: Not on file     Gets together: Not on file     Attends Sabianist service: Not on file     Active member of club or organization: Not on file     Attends meetings of clubs or organizations: Not on file     Relationship status: Not on file    Intimate partner violence     Fear of current or ex partner: Not on file     Emotionally abused: Not on file     Physically abused: Not on file     Forced sexual activity: Not on file   Other Topics Concern    Not on file   Social History Narrative    Not on file         Current Outpatient Medications:     Ascorbic Acid (vitamin C) 100 MG tablet, Take 100 mg by mouth daily, Disp: , Rfl:     cholecalciferol (VITAMIN D3) 400 units tablet, Take 400 Units by mouth daily, Disp: , Rfl:     Prenatal Vit-Fe Fumarate-FA (PRENATAL 1+1 PO), Take by mouth, Disp: , Rfl:     acetaminophen (TYLENOL) 650 mg CR tablet, Take 1 tablet (650 mg total) by mouth every 8 (eight) hours as needed for mild pain (Patient not taking: Reported on 5/9/2019), Disp: 30 tablet, Rfl: 0    omeprazole (PriLOSEC) 20 mg delayed release capsule, As needed, Disp: , Rfl:     Allergies   Allergen Reactions    Other Hives     Shrimp/shellfish    Penicillin G Hives    Pepcid [Famotidine] Chest Pain    Shellfish Allergy - Food Allergy Hives and Edema       Objective   Vitals:    05/18/21 0729   BP: 136/80   BP Location: Right arm   Patient Position: Sitting   Cuff Size: Standard   Weight: 87 5 kg (193 lb)     Physical Exam  Constitutional:       Appearance: She is well-developed  HENT:      Head: Normocephalic and atraumatic  Cardiovascular:      Rate and Rhythm: Normal rate and regular rhythm  Pulmonary:      Effort: Pulmonary effort is normal       Breath sounds: Normal breath sounds     Chest:      Breasts: Breasts are symmetrical          Right: No inverted nipple, mass, nipple discharge, skin change or tenderness  Left: No inverted nipple, mass, nipple discharge, skin change or tenderness  Abdominal:      General: There is no distension  Palpations: Abdomen is soft  There is no mass  Tenderness: There is no guarding or rebound  Genitourinary:     Exam position: Supine  Labia:         Right: No rash  Left: No rash  Vagina: No signs of injury and foreign body  No vaginal discharge or erythema  Cervix: No cervical motion tenderness  Adnexa:         Right: No mass  Left: No mass  Rectum: Guaiac result negative  Skin:     General: Skin is warm and dry  Neurological:      Mental Status: She is alert and oriented to person, place, and time  Patient Instructions   Pap deferred due to guidelines  Mammo ordered  Has plan to f/u w  through [facility] that her sister uses to review family and genetic history  Will plan to continue to f/w PCP for routine care and labs to eval cholesterol in the next month or so

## 2021-05-18 ENCOUNTER — ANNUAL EXAM (OUTPATIENT)
Dept: OBGYN CLINIC | Facility: CLINIC | Age: 41
End: 2021-05-18
Payer: COMMERCIAL

## 2021-05-18 VITALS — BODY MASS INDEX: 37.69 KG/M2 | DIASTOLIC BLOOD PRESSURE: 80 MMHG | SYSTOLIC BLOOD PRESSURE: 136 MMHG | WEIGHT: 193 LBS

## 2021-05-18 DIAGNOSIS — Z12.31 ENCOUNTER FOR SCREENING MAMMOGRAM FOR MALIGNANT NEOPLASM OF BREAST: ICD-10-CM

## 2021-05-18 DIAGNOSIS — Z01.419 WELL WOMAN EXAM: Primary | ICD-10-CM

## 2021-05-18 PROBLEM — N92.0 MENORRHAGIA WITH REGULAR CYCLE: Status: RESOLVED | Noted: 2018-11-20 | Resolved: 2021-05-18

## 2021-05-18 PROBLEM — N92.0 MENORRHAGIA: Status: RESOLVED | Noted: 2017-01-30 | Resolved: 2021-05-18

## 2021-05-18 PROCEDURE — 99396 PREV VISIT EST AGE 40-64: CPT | Performed by: PHYSICIAN ASSISTANT

## 2021-05-18 RX ORDER — OMEGA-3S/DHA/EPA/FISH OIL/D3 300MG-1000
400 CAPSULE ORAL DAILY
COMMUNITY
End: 2022-07-19

## 2021-05-18 RX ORDER — RIBOFLAVIN (VITAMIN B2) 100 MG
100 TABLET ORAL DAILY
COMMUNITY
End: 2022-07-19

## 2021-05-18 NOTE — PATIENT INSTRUCTIONS
Pap deferred due to guidelines  Mammo ordered  Has plan to f/u w  through [address] [place] [address] [address] that her sister uses to review family and genetic history  Will plan to continue to f/w PCP for routine care and labs to eval cholesterol in the next month or so

## 2022-03-15 ENCOUNTER — APPOINTMENT (OUTPATIENT)
Dept: RADIOLOGY | Facility: CLINIC | Age: 42
End: 2022-03-15
Payer: COMMERCIAL

## 2022-03-15 ENCOUNTER — OFFICE VISIT (OUTPATIENT)
Dept: OBGYN CLINIC | Facility: CLINIC | Age: 42
End: 2022-03-15
Payer: COMMERCIAL

## 2022-03-15 VITALS — WEIGHT: 193.4 LBS | BODY MASS INDEX: 37.77 KG/M2 | TEMPERATURE: 99 F

## 2022-03-15 DIAGNOSIS — Z01.89 ENCOUNTER FOR OTHER SPECIFIED SPECIAL EXAMINATIONS: ICD-10-CM

## 2022-03-15 DIAGNOSIS — M25.562 LEFT KNEE PAIN, UNSPECIFIED CHRONICITY: ICD-10-CM

## 2022-03-15 DIAGNOSIS — M17.12 PRIMARY OSTEOARTHRITIS OF LEFT KNEE: Primary | ICD-10-CM

## 2022-03-15 PROCEDURE — 20610 DRAIN/INJ JOINT/BURSA W/O US: CPT | Performed by: ORTHOPAEDIC SURGERY

## 2022-03-15 PROCEDURE — 73562 X-RAY EXAM OF KNEE 3: CPT

## 2022-03-15 PROCEDURE — 73560 X-RAY EXAM OF KNEE 1 OR 2: CPT

## 2022-03-15 PROCEDURE — 99204 OFFICE O/P NEW MOD 45 MIN: CPT | Performed by: ORTHOPAEDIC SURGERY

## 2022-03-15 RX ORDER — DEXAMETHASONE SODIUM PHOSPHATE 100 MG/10ML
40 INJECTION INTRAMUSCULAR; INTRAVENOUS
Status: COMPLETED | OUTPATIENT
Start: 2022-03-15 | End: 2022-03-15

## 2022-03-15 RX ORDER — LIDOCAINE HYDROCHLORIDE 10 MG/ML
4 INJECTION, SOLUTION INFILTRATION; PERINEURAL
Status: COMPLETED | OUTPATIENT
Start: 2022-03-15 | End: 2022-03-15

## 2022-03-15 RX ADMIN — DEXAMETHASONE SODIUM PHOSPHATE 40 MG: 100 INJECTION INTRAMUSCULAR; INTRAVENOUS at 12:03

## 2022-03-15 RX ADMIN — LIDOCAINE HYDROCHLORIDE 4 ML: 10 INJECTION, SOLUTION INFILTRATION; PERINEURAL at 12:03

## 2022-03-15 NOTE — PROGRESS NOTES
Assessment/Plan:  1  Primary osteoarthritis of left knee  XR knee 3 vw left non injury    Ambulatory Referral to Physical Therapy    Large joint arthrocentesis: L knee       Scribe Attestation    I,:  Lewis Mancia am acting as a scribe while in the presence of the attending physician :       I,:  Nidia Rodriguez MD personally performed the services described in this documentation    as scribed in my presence :         Cindy examination and review the x-rays of her left knee does demonstrate signs and symptoms consistent with an acute exacerbation of osteoarthritis  I did review her operative note from 5 years ago that did demonstrate grade 2 arthritic changes of the undersurface of patella as well as grade 3 broad arthritic changes at the medial femoral condyle  These are the areas that she is most symptomatic on exam today  She does demonstrate functional range of motion and stability in all planes on exam today  I did discuss treatment options going forward in the form of home remedies such as ice, heat and oral analgesics  She can also consider physical therapy to focus on strengthening of the left lower extremity with the hopes of alleviating her pain  I did also offer her a steroid injection today  She was amenable to this and tolerated the injection well with no complications  Post injection instructions were provided  She can also consider viscosupplementation injections in the future  Cindy verbalized understanding and was amenable to the treatment plan presented to her today  Large joint arthrocentesis: L knee  Universal Protocol:  Consent: Verbal consent obtained  Risks and benefits: risks, benefits and alternatives were discussed  Consent given by: patient  Time out: Immediately prior to procedure a "time out" was called to verify the correct patient, procedure, equipment, support staff and site/side marked as required    Timeout called at: 3/15/2022 12:02 PM   Patient understanding: patient states understanding of the procedure being performed  Site marked: the operative site was marked  Radiology Images displayed and confirmed  If images not available, report reviewed: imaging studies available    Supporting Documentation  Indications: pain   Procedure Details  Location: knee - L knee  Preparation: Patient was prepped and draped in the usual sterile fashion  Needle size: 22 G  Ultrasound guidance: no  Approach: anterolateral  Medications administered: 4 mL lidocaine 1 %; 40 mg dexamethasone 100 mg/10 mL    Patient tolerance: patient tolerated the procedure well with no immediate complications  Dressing:  Sterile dressing applied          Subjective:   Calli Ghotra is a 35 y o  female who presents to the office today for initial evaluation of her left knee  She does have a history of a left knee arthroscopy with medial and lateral meniscectomy performed approximately 5 years ago  She states that November 2021 she tripped over her dog gate exacerbating her painful symptoms  She states that she is experiencing intermittent moderate sometimes severe sharp pain globally about the knee  She does on swelling to the anterior medial aspect knee as well as a fullness posteriorly  She does experience intermittent clicking and crunching of the knee that can be painful at times  She denies any gross instability of the knee  She notes that her pain is worsened when she attempts a positional change from sitting standing  Today she denies any distal paresthesias  Review of Systems   Constitutional: Negative for chills, fever and unexpected weight change  HENT: Negative for hearing loss, nosebleeds and sore throat  Eyes: Negative for pain, redness and visual disturbance  Respiratory: Negative for cough, shortness of breath and wheezing  Cardiovascular: Negative for chest pain, palpitations and leg swelling     Gastrointestinal: Negative for abdominal pain, nausea and vomiting  Endocrine: Negative for polydipsia and polyuria  Genitourinary: Negative for dysuria and hematuria  Musculoskeletal: Positive for arthralgias and joint swelling  See HPI   Skin: Negative for rash and wound  Neurological: Negative for dizziness, numbness and headaches  Psychiatric/Behavioral: Negative for decreased concentration and suicidal ideas  The patient is not nervous/anxious            Past Medical History:   Diagnosis Date    Anemia     Arthritis     Menorrhagia with regular cycle 11/20/2018    Added automatically from request for surgery [id number]       Past Surgical History:   Procedure Laterality Date    ABDOMINAL SURGERY      2 c-sections    ARTHROSCOPY KNEE Left 12/30/2016    Procedure: ARTHROSCOPY KNEE LATERAL aND MEDIAL MENISCECTOMY;  Surgeon: Rohini Ta MD;  Location: [facility] MAIN OR;  Service:    [facility] [address]  2007    lap    KNEE ARTHROSCOPY Left 2007    PA HYSTEROSCOPY,W/ENDO BX N/A 11/29/2018    Procedure: DILATATION AND CURETTAGE (D&C) WITH HYSTEROSCOPY;  Surgeon: Alfredo Waller MD;  Location:  MAIN OR;  Service: Gynecology    PA HYSTEROSCOPY,W/ENDOMETRIAL ABLATION N/A 11/29/2018    Procedure: Cordie Cargo;  Surgeon: Alfredo Waller MD;  [facility];  Service: Gynecology       Family History   Problem Relation Age of Onset    Hyperlipidemia Mother     Hypertension Mother     Breast cancer Mother     Hypertension Father     Heart disease Father     Breast cancer Maternal Grandmother     Ovarian cancer Paternal Grandmother        Social History     Occupational History    Not on file   Tobacco Use    Smoking status: Never Smoker    Smokeless tobacco: Never Used   Substance and Sexual Activity    Alcohol use: No    Drug use: No    Sexual activity: Yes     Partners: Male     Birth control/protection: None         Current Outpatient Medications:     acetaminophen (TYLENOL) 650 mg CR tablet, Take 1 tablet (650 mg total) by mouth every 8 (eight) hours as needed for mild pain (Patient not taking: Reported on 5/9/2019), Disp: 30 tablet, Rfl: 0    Ascorbic Acid (vitamin C) 100 MG tablet, Take 100 mg by mouth daily, Disp: , Rfl:     cholecalciferol (VITAMIN D3) 400 units tablet, Take 400 Units by mouth daily, Disp: , Rfl:     omeprazole (PriLOSEC) 20 mg delayed release capsule, As needed, Disp: , Rfl:     Prenatal Vit-Fe Fumarate-FA (PRENATAL 1+1 PO), Take by mouth, Disp: , Rfl:     Allergies   Allergen Reactions    Other Hives     Shrimp/shellfish    Penicillin G Hives    Pepcid [Famotidine] Chest Pain    Shellfish Allergy - Food Allergy Hives and Edema       Objective:  Vitals:    03/15/22 1058   Temp: 99 °F (37 2 °C)       Left Knee Exam     Tenderness   Left knee tenderness location: Patellofemoral joint  Range of Motion   Extension: 0   Flexion: 120     Tests   Varus: negative Valgus: negative  Drawer:  Anterior - negative     Posterior - negative    Other   Erythema: absent  Scars: absent  Sensation: normal  Pulse: present  Effusion: effusion present          Observations   Left Knee   Positive for effusion  Physical Exam  Vitals reviewed  HENT:      Head: Normocephalic and atraumatic  Eyes:      General:         Right eye: No discharge  Left eye: No discharge  Conjunctiva/sclera: Conjunctivae normal       Pupils: Pupils are equal, round, and reactive to light  Cardiovascular:      Rate and Rhythm: Normal rate  Pulmonary:      Effort: Pulmonary effort is normal  No respiratory distress  Musculoskeletal:      Cervical back: Normal range of motion and neck supple  Left knee: Effusion present  Comments: As noted in HPI   Skin:     General: Skin is warm and dry  Neurological:      Mental Status: She is alert and oriented to person, place, and time             I have personally reviewed pertinent films in PACS and my interpretation is as follows:    X-rays of left knee demonstrates mild tricompartmental osteoarthritis most severe in patellofemoral joint osteophytes lateral femoral condyle

## 2022-07-18 NOTE — PROGRESS NOTES
Assessment/Plan   Problem List Items Addressed This Visit        Other    Well woman exam - Primary    Relevant Orders    Liquid-based pap, screening      Other Visit Diagnoses     Family history of breast cancer        Relevant Orders    US breast screening bilateral complete (ABUS)    MRI breast bilateral w and wo contrast w cad    Menorrhagia with regular cycle        Relevant Orders    US pelvis complete non OB          Discussion    All questions have been answered to her satisfaction  RTO for APE or sooner if needed  Will plan pelvic US due to recent increase in menstrual flow  Subjective     HPI   Worthy Sports is a 35 y o  female who presents for annual well woman exam    LMP -7/6/22 ; Pt has had irregular periods since ablation  They are somewhat lighter but still coming regularly  She has noted that they seem to be heavier more recently with increased cramping  She is worried that she may have another polyp  Will plan pelvic US  No vulvar itch/burn; No vaginal itch/burn; No abn discharge or odor; No urinary sx - burning/pain/frequency/hematuria    (+) SBEs - no breast masses, asymmetry, nipple discharge or bleeding, changes in skin of breast, or breast tenderness bilaterally  Pt had gotten recommendation previously from provider at [address] [place] [address] that had advised US and MRI of her breasts due to densities and family history  Pt was very anxious and was unable to perform the MRI  She has US done at work and will plan to get a copy sent to us  She requests slips for the b/l breast MRI as well as ABUS to do again when due ([address] last done 1/2022)  No abd/pelvic pain or HAs; No menopausal symptoms: No hot flashes/night sweats, problems with intercourse, vaginal dryness; sleeping well  Pt is sexually active in a mutually monog/ sexual relationship; No issues with intercourse; She declines sti/hiv/hep testing; Feels safe at home    (+) PCP for routine Bw/care;     Last Pap - 9/19/18 WNL neg HPV  History of abnormal Pap smear: denies   Last mammo -  BIRADS 1, US done 2022  Last colonoscopy - age 38 due to GI issues    Review of Systems   Constitutional: Negative  Respiratory: Negative  Gastrointestinal: Negative  Endocrine: Negative  Genitourinary: Negative          The following portions of the patient's history were reviewed and updated as appropriate: allergies, current medications, past family history, past medical history, past social history, past surgical history and problem list          OB History        2    Para   2    Term                AB        Living           SAB        IAB        Ectopic        Multiple        Live Births   2                 Past Medical History:   Diagnosis Date    Anemia     Arthritis     Menorrhagia with regular cycle 2018    Added automatically from request for surgery [id number]       Past Surgical History:   Procedure Laterality Date    ABDOMINAL SURGERY      2 c-sections    ARTHROSCOPY KNEE Left 2016    Procedure: ARTHROSCOPY KNEE LATERAL aND MEDIAL MENISCECTOMY;  Surgeon: Kimberly Kasper MD;  Location: [facility] MAIN OR;  Service:    [facility] [address]      lap    KNEE ARTHROSCOPY Left     MS HYSTEROSCOPY,W/ENDO BX N/A 2018    Procedure: DILATATION AND CURETTAGE (D&C) WITH HYSTEROSCOPY;  Surgeon: Asuncion Pope MD;  [facility] MA[facility];  Service: Gynecology    MS HYSTEROSCOPY,W/ENDOMETRIAL ABLATION N/A 2018    Procedure: ABLATION ENDOMETRIAL ENEDELIA;  Surgeon: Asuncion Pope MD;  [facility] MA[facility];  Service: Gynecology       Family History   Problem Relation Age of Onset    Hyperlipidemia Mother     Hypertension Mother     Breast cancer Mother     Hypertension Father     Heart disease Father     Breast cancer Maternal Grandmother     Ovarian cancer Paternal Grandmother        Social History     Socioeconomic History    Marital status: /Civil Union Spouse name: Not on file    Number of children: Not on file    Years of education: Not on file    Highest education level: Not on file   Occupational History    Not on file   Tobacco Use    Smoking status: Never Smoker    Smokeless tobacco: Never Used   Substance and Sexual Activity    Alcohol use: Yes     Comment: social, occasional    Drug use: No    Sexual activity: Yes     Partners: Male     Birth control/protection: None   Other Topics Concern    Not on file   Social History Narrative    Not on file     Social Determinants of Health     Financial Resource Strain: Not on file   Food Insecurity: Not on file   Transportation Needs: Not on file   Physical Activity: Not on file   Stress: Not on file   Social Connections: Not on file   Intimate Partner Violence: Not on file   Housing Stability: Not on file         Current Outpatient Medications:     Prenatal Vit-Fe Fumarate-FA (PRENATAL 1+1 PO), Take by mouth, Disp: , Rfl:     Allergies   Allergen Reactions    Other Hives     Shrimp/shellfish    Penicillin G Hives    Pepcid [Famotidine] Chest Pain    Shellfish Allergy - Food Allergy Hives and Edema       Objective   Vitals:    07/19/22 1225   BP: 124/82   BP Location: Left arm   Patient Position: Sitting   Cuff Size: Standard   Weight: 87 4 kg (192 lb 9 6 oz)   Height: 5' (1 524 m)     Physical Exam  Vitals reviewed  Constitutional:       Appearance: She is well-developed  HENT:      Head: Normocephalic and atraumatic  Cardiovascular:      Rate and Rhythm: Normal rate and regular rhythm  Pulmonary:      Effort: Pulmonary effort is normal       Breath sounds: Normal breath sounds  Chest:   Breasts: Breasts are symmetrical       Right: No inverted nipple, mass, nipple discharge, skin change or tenderness  Left: No inverted nipple, mass, nipple discharge, skin change or tenderness  Abdominal:      General: There is no distension  Palpations: Abdomen is soft  There is no mass  Tenderness: There is no guarding or rebound  Genitourinary:     Exam position: Supine  Labia:         Right: No rash  Left: No rash  Vagina: No signs of injury and foreign body  No vaginal discharge or erythema  Cervix: No cervical motion tenderness  Adnexa:         Right: No mass  Left: No mass  Rectum: Guaiac result negative  Skin:     General: Skin is warm and dry  Neurological:      Mental Status: She is alert and oriented to person, place, and time  There are no Patient Instructions on file for this visit

## 2022-07-19 ENCOUNTER — ANNUAL EXAM (OUTPATIENT)
Dept: OBGYN CLINIC | Facility: CLINIC | Age: 42
End: 2022-07-19
Payer: COMMERCIAL

## 2022-07-19 VITALS
DIASTOLIC BLOOD PRESSURE: 82 MMHG | BODY MASS INDEX: 37.81 KG/M2 | SYSTOLIC BLOOD PRESSURE: 124 MMHG | HEIGHT: 60 IN | WEIGHT: 192.6 LBS

## 2022-07-19 DIAGNOSIS — Z80.3 FAMILY HISTORY OF BREAST CANCER: ICD-10-CM

## 2022-07-19 DIAGNOSIS — Z01.419 WELL WOMAN EXAM: Primary | ICD-10-CM

## 2022-07-19 DIAGNOSIS — N92.0 MENORRHAGIA WITH REGULAR CYCLE: ICD-10-CM

## 2022-07-19 PROCEDURE — G0476 HPV COMBO ASSAY CA SCREEN: HCPCS | Performed by: PHYSICIAN ASSISTANT

## 2022-07-19 PROCEDURE — 99396 PREV VISIT EST AGE 40-64: CPT | Performed by: PHYSICIAN ASSISTANT

## 2022-07-19 PROCEDURE — G0145 SCR C/V CYTO,THINLAYER,RESCR: HCPCS | Performed by: PHYSICIAN ASSISTANT

## 2022-07-19 PROCEDURE — 0503F POSTPARTUM CARE VISIT: CPT | Performed by: PHYSICIAN ASSISTANT

## 2022-07-21 LAB
HPV HR 12 DNA CVX QL NAA+PROBE: NEGATIVE
HPV16 DNA CVX QL NAA+PROBE: NEGATIVE
HPV18 DNA CVX QL NAA+PROBE: NEGATIVE

## 2022-07-25 LAB
LAB AP GYN PRIMARY INTERPRETATION: NORMAL
LAB AP LMP: NORMAL
Lab: NORMAL

## 2022-08-10 ENCOUNTER — OFFICE VISIT (OUTPATIENT)
Dept: OBGYN CLINIC | Facility: CLINIC | Age: 42
End: 2022-08-10
Payer: COMMERCIAL

## 2022-08-10 VITALS — TEMPERATURE: 98.3 F | HEIGHT: 61 IN | WEIGHT: 193.4 LBS | BODY MASS INDEX: 36.51 KG/M2

## 2022-08-10 DIAGNOSIS — M23.92 ACUTE INTERNAL DERANGEMENT OF LEFT KNEE: Primary | ICD-10-CM

## 2022-08-10 PROCEDURE — 99213 OFFICE O/P EST LOW 20 MIN: CPT | Performed by: PHYSICIAN ASSISTANT

## 2022-08-10 NOTE — PROGRESS NOTES
Assessment/Plan:  1  Acute internal derangement of left knee  MRI knee left  wo contrast     The patient has significant swelling about knee and ongoing pain despite conservative treatment thus far  I am ordering a new MRI to evaluate for recurrent meniscus tear  She will FU after the MRI to discuss the results and how to proceed  Subjective:   Kenyatta Hernandez is a 35 y o  female presents today for follow-up of her left knee  She did have an arthroscopic meniscectomy back in 2017 on this knee  The patient was seen in March and was given a steroid injection which did help some  She was recently on a trip and after her flight home started with increased swelling and pain about the knee  This was back in mid July  She denies any swelling or significant discomfort in the calf though  She notes limitations in flexion due to her knee swelling  She gets diffuse pain about the knee as well  She notes good sensation the left lower extremity    Review of Systems   Constitutional: Negative  Negative for chills and fever  HENT: Negative  Negative for ear pain and sore throat  Eyes: Negative  Negative for pain and redness  Respiratory: Negative  Negative for shortness of breath and wheezing  Cardiovascular: Negative for chest pain and palpitations  Gastrointestinal: Negative  Negative for abdominal pain and blood in stool  Endocrine: Negative  Negative for polydipsia and polyuria  Genitourinary: Negative  Negative for difficulty urinating and dysuria  Musculoskeletal:        As noted in HPI   Skin: Negative  Negative for pallor and rash  Neurological: Negative  Negative for dizziness and numbness  Hematological: Negative  Negative for adenopathy  Does not bruise/bleed easily  Psychiatric/Behavioral: Negative  Negative for confusion and suicidal ideas           Past Medical History:   Diagnosis Date    Anemia     Arthritis     Menorrhagia with regular cycle 11/20/2018    Added automatically from request for surgery [id number]       Past Surgical History:   Procedure Laterality Date    ABDOMINAL SURGERY      2 c-sections    ARTHROSCOPY KNEE Left 12/30/2016    Procedure: ARTHROSCOPY KNEE LATERAL aND MEDIAL MENISCECTOMY;  Surgeon: Светлана Verduzco MD;  Location[facility] MAIN OR;  Service:    [facility]      CHOLECYSTECTOMY  2007    lap    KNEE ARTHROSCOPY Left 2007    IN HYSTEROSCOPY,W/ENDO BX N/A 11/29/2018    Procedure: DILATATION AND CURETTAGE (D&C) WITH HYSTEROSCOPY;  Surgeon: Tana Rivera MD;  [facility] MA[facility];  Service: Gynecology    IN HYSTEROSCOPY,W/ENDOMETRIAL ABLATION N/A 11/29/2018    Procedure: Verlene Feast;  Surgeon: Tana Rivera MD;  [facility];  Service: Gynecology       Family History   Problem Relation Age of Onset    Hyperlipidemia Mother     Hypertension Mother     Breast cancer Mother     Hypertension Father     Heart disease Father     Breast cancer Maternal Grandmother     Ovarian cancer Paternal Grandmother        Social History     Occupational History    Not on file   Tobacco Use    Smoking status: Never Smoker    Smokeless tobacco: Never Used   Substance and Sexual Activity    Alcohol use: Yes     Comment: social, occasional    Drug use: No    Sexual activity: Yes     Partners: Male     Birth control/protection: None         Current Outpatient Medications:     Prenatal Vit-Fe Fumarate-FA (PRENATAL 1+1 PO), Take by mouth, Disp: , Rfl:     Allergies   Allergen Reactions    Other Hives     Shrimp/shellfish    Penicillin G Hives    Pepcid [Famotidine] Chest Pain    Shellfish Allergy - Food Allergy Hives and Edema       Objective:  Vitals:    08/10/22 1529   Temp: 98 3 °F (36 8 °C)       Left Knee Exam     Tenderness   The patient is experiencing tenderness in the medial joint line      Range of Motion   Extension: 0   Flexion: 120     Tests   Varus: negative Valgus: negative  Drawer:  Anterior - negative Posterior - negative    Other   Erythema: absent  Sensation: normal  Pulse: present  Effusion: effusion (2+) present          Observations   Left Knee   Positive for effusion (2+)  Physical Exam  Constitutional:       General: She is not in acute distress  Appearance: She is well-developed  HENT:      Head: Normocephalic and atraumatic  Eyes:      General: No scleral icterus  Conjunctiva/sclera: Conjunctivae normal    Neck:      Vascular: No JVD  Cardiovascular:      Rate and Rhythm: Normal rate  Pulmonary:      Effort: Pulmonary effort is normal  No respiratory distress  Musculoskeletal:      Left knee: Effusion (2+) present  Skin:     General: Skin is warm  Neurological:      Mental Status: She is alert and oriented to person, place, and time        Coordination: Coordination normal

## 2022-08-17 ENCOUNTER — TELEPHONE (OUTPATIENT)
Dept: OBGYN CLINIC | Facility: CLINIC | Age: 42
End: 2022-08-17

## 2022-08-17 NOTE — TELEPHONE ENCOUNTER
Patient called back, looks like Alden Márquez said she will need to complete formal PT   Before I call there patient is it usually 4 or 6 weeks that she has to do first?

## 2022-08-17 NOTE — TELEPHONE ENCOUNTER
MRI has not been denied at this time, however, Ins is asking for documentation of 6 weeks of conservative treatment within the last three months  They do not accept the injection in March as part of the conservative treatment  They are inquiring about PT, if she's completed this  MRI decision is in MD review at this time  How would you like to proceed?

## 2022-08-18 DIAGNOSIS — M17.12 PRIMARY OSTEOARTHRITIS OF LEFT KNEE: ICD-10-CM

## 2022-08-18 DIAGNOSIS — M23.92 ACUTE INTERNAL DERANGEMENT OF LEFT KNEE: Primary | ICD-10-CM

## 2022-08-18 NOTE — TELEPHONE ENCOUNTER
Spoke with patient and advised a peer to peer would probably not be approved as there is no documentation that she completed PT  Patient states she did it through her sister as she is a physical therapist  I noted there is still no documentation and since it was back in March she would still need to complete a new 6 week round of physical therapy as it needed to be within 6 months   Advised there is a new PT script in her chart for her to do, also stated if she decided to stick with her sister in law, to please have it documented  Patient states she would be getting an MRI out of pocket at her place of employment , and said she would bring in the report and disc in for her appointment on the 14       I advised and mentioned she is more than welcome to do so and we look forward to seeing her on 9/14    Patient was pleased

## 2022-08-18 NOTE — TELEPHONE ENCOUNTER
Patient said she had PT on her own with her sister who is a  Physical Therapist      Asking if a peer to peer review can be done?      # [phone]

## 2022-09-14 ENCOUNTER — OFFICE VISIT (OUTPATIENT)
Dept: OBGYN CLINIC | Facility: CLINIC | Age: 42
End: 2022-09-14
Payer: COMMERCIAL

## 2022-09-14 VITALS
WEIGHT: 194 LBS | TEMPERATURE: 98 F | RESPIRATION RATE: 19 BRPM | HEIGHT: 61 IN | DIASTOLIC BLOOD PRESSURE: 75 MMHG | SYSTOLIC BLOOD PRESSURE: 125 MMHG | BODY MASS INDEX: 36.63 KG/M2 | HEART RATE: 78 BPM

## 2022-09-14 DIAGNOSIS — M25.562 CHRONIC PAIN OF LEFT KNEE: Primary | ICD-10-CM

## 2022-09-14 DIAGNOSIS — G89.29 CHRONIC PAIN OF LEFT KNEE: Primary | ICD-10-CM

## 2022-09-14 DIAGNOSIS — M62.559 ATROPHY OF QUADRICEPS FEMORIS MUSCLE: ICD-10-CM

## 2022-09-14 PROCEDURE — 99214 OFFICE O/P EST MOD 30 MIN: CPT | Performed by: ORTHOPAEDIC SURGERY

## 2022-09-14 NOTE — PROGRESS NOTES
Assessment/Plan:  1  Chronic pain of left knee  Ambulatory Referral to Physical Therapy    Sedimentation rate, automated    C-reactive protein    AILYN Screen w/ Reflex to Titer/Pattern    Lyme Antibody Profile with reflex to WB   2  Atrophy of quadriceps femoris muscle  Ambulatory Referral to Physical Therapy     Scribe Attestation    I,:  Westmakeda Sara am acting as a scribe while in the presence of the attending physician :       I,:  Lin Pastrana MD personally performed the services described in this documentation    as scribed in my presence :         Agustina Trent presents today for review of her left knee  Upon exam today her knee appears stable and I disagree with the MRI report  Her clinical exam and reviewing the MRI images, I do not believe she has a torn ACL  I do however believe she has degenerative changes of the ACL with subchondral edema and a tibial cyst likely associated with osteoarthritis  I did discuss possible risk of Lyme disease, and I did order a Lyme titer as she does have an effusion without any obvious trauma  I would like to order labs to evaluate for rheumatologic disorders or infection of the right knee  I would like to see her back in 6 weeks for repeat clinical evaluation  I believe the physical therapy will be helpful as she does have significant atrophy of the quadriceps on that left side  I do not recommend surgical intervention at this point for the knee  I will see her back earllier than 6 weeks if abnormal lab results are present  Subjective:   Emanuel Love is a 38 y o  female who presents follow up evaluation of her left knee  She was seen in January and received a steroid injection that seemed to help her for a few months  After she went on vacation in the summer, her knee felt stiff and swelled significantly  She has felt unstable, with click and cracking in the knee  She feels unable to work or workout without significant difficulty   She has difficulty reaching full extension  She feels stiffness and aching pain with prolonged sitting  After she gets walking       Review of Systems   Constitutional: Negative for chills, fever and unexpected weight change  HENT: Negative for nosebleeds and sore throat  Eyes: Negative for pain, redness and visual disturbance  Respiratory: Negative for cough, shortness of breath and wheezing  Cardiovascular: Negative for chest pain, palpitations and leg swelling  Gastrointestinal: Negative for nausea and vomiting  Endocrine: Negative for polydipsia and polyuria  Genitourinary: Negative for dysuria and hematuria  Musculoskeletal: Positive for arthralgias and myalgias  Negative for gait problem  As noted in HPI   Skin: Negative for rash and wound  Neurological: Negative for dizziness, numbness and headaches  Psychiatric/Behavioral: Negative for decreased concentration  The patient is not nervous/anxious            Past Medical History:   Diagnosis Date    Anemia     Arthritis     Menorrhagia with regular cycle 11/20/2018    Added automatically from request for surgery [id number]       Past Surgical History:   Procedure Laterality Date    ABDOMINAL SURGERY      2 c-sections    ARTHROSCOPY KNEE Left 12/30/2016    Procedure: ARTHROSCOPY KNEE LATERAL aND MEDIAL MENISCECTOMY;  Surgeon: Kristin Lua MD;  Location: [facility] MAIN OR;  Service:    [address] [place] [address] A  2007    lap    KNEE ARTHROSCOPY Left 2007    NC HYSTEROSCOPY,W/ENDO BX N/A 11/29/2018    Procedure: DILATATION AND CURETTAGE (D&C) WITH HYSTEROSCOPY;  Surgeon: Nina Bardales MD;  [facility] MAIN [facility];  Service: Gynecology    NC HYSTEROSCOPY,W/ENDOMETRIAL ABLATION N/A 11/29/2018    Procedure: Travon Alejandro;  Surgeon: Nina Bardales MD;  [facility] MA[facility];  Service: Gynecology       Family History   Problem Relation Age of Onset    Hyperlipidemia Mother     Hypertension Mother    Aurelio Alberts Breast cancer Mother  Hypertension Father     Heart disease Father     Breast cancer Maternal Grandmother     Ovarian cancer Paternal Grandmother        Social History     Occupational History    Not on file   Tobacco Use    Smoking status: Never Smoker    Smokeless tobacco: Never Used   Substance and Sexual Activity    Alcohol use: Yes     Comment: social, occasional    Drug use: No    Sexual activity: Yes     Partners: Male     Birth control/protection: None         Current Outpatient Medications:     Prenatal Vit-Fe Fumarate-FA (PRENATAL 1+1 PO), Take by mouth, Disp: , Rfl:     Allergies   Allergen Reactions    Other Hives     Shrimp/shellfish    Penicillin G Hives    Pepcid [Famotidine] Chest Pain    Shellfish Allergy - Food Allergy Hives and Edema       Objective:  Vitals:    09/14/22 1640   BP: 125/75   Pulse: 78   Resp: 19   Temp: 98 °F (36 7 °C)       Left Knee Exam     Range of Motion   Extension: -5   Flexion: 120     Tests   Varus: negative Valgus: negative  Lachman:  Anterior - negative    Posterior - negative  Drawer:  Anterior - negative     Posterior - negative    Other   Sensation: normal  Pulse: present    Comments:  Crepitus of the knee and atrophy of quadriceps muscles            Physical Exam  Vitals reviewed  HENT:      Head: Normocephalic and atraumatic  Eyes:      General:         Right eye: No discharge  Left eye: No discharge  Conjunctiva/sclera: Conjunctivae normal       Pupils: Pupils are equal, round, and reactive to light  Cardiovascular:      Rate and Rhythm: Normal rate  Pulses: Normal pulses  Musculoskeletal:         General: No tenderness or signs of injury  Cervical back: Normal range of motion and neck supple  Comments: As noted in HPI   Skin:     General: Skin is warm  Neurological:      Mental Status: She is alert           I have personally reviewed pertinent films in PACS and my interpretation is as follows:  MRI of the left knee demonstrates a degenerative ACL that is intact, subchondral edema and a tibial plateau cyst likely associated with arthritic changes  Suprapatellar and intra-articular joint fluid present  This document was created using speech voice recognition software  Grammatical errors, random word insertions, pronoun errors, and incomplete sentences are an occasional consequence of this system due to software limitations, ambient noise, and hardware issues  Any formal questions or concerns about content, text, or information contained within the body of this dictation should be directly addressed to the provider for clarification

## 2022-09-15 ENCOUNTER — LAB (OUTPATIENT)
Dept: LAB | Facility: CLINIC | Age: 42
End: 2022-09-15
Payer: COMMERCIAL

## 2022-09-15 DIAGNOSIS — G89.29 CHRONIC PAIN OF LEFT KNEE: ICD-10-CM

## 2022-09-15 DIAGNOSIS — M25.562 CHRONIC PAIN OF LEFT KNEE: ICD-10-CM

## 2022-09-15 LAB
CRP SERPL QL: 4.9 MG/L
ERYTHROCYTE [SEDIMENTATION RATE] IN BLOOD: 9 MM/HOUR (ref 0–19)

## 2022-09-15 PROCEDURE — 86618 LYME DISEASE ANTIBODY: CPT

## 2022-09-15 PROCEDURE — 86140 C-REACTIVE PROTEIN: CPT

## 2022-09-15 PROCEDURE — 85652 RBC SED RATE AUTOMATED: CPT

## 2022-09-15 PROCEDURE — 36415 COLL VENOUS BLD VENIPUNCTURE: CPT

## 2022-09-15 PROCEDURE — 86038 ANTINUCLEAR ANTIBODIES: CPT

## 2022-09-16 LAB
B BURGDOR IGG+IGM SER-ACNC: <0.2 AI
RYE IGE QN: NEGATIVE

## 2022-09-16 NOTE — RESULT ENCOUNTER NOTE
I did call her with her laboratory results which were negative except for a very slightly elevated C reactive protein  She is going to perform physical therapy but is having some difficulty getting into therapy  I did ask her to follow up with me about 6 weeks after she starts her physical therapy  She was appreciative of the phone call

## 2022-10-26 ENCOUNTER — OFFICE VISIT (OUTPATIENT)
Dept: OBGYN CLINIC | Facility: CLINIC | Age: 42
End: 2022-10-26
Payer: COMMERCIAL

## 2022-10-26 VITALS
HEIGHT: 60 IN | BODY MASS INDEX: 38.09 KG/M2 | WEIGHT: 194 LBS | SYSTOLIC BLOOD PRESSURE: 136 MMHG | DIASTOLIC BLOOD PRESSURE: 79 MMHG | HEART RATE: 114 BPM

## 2022-10-26 DIAGNOSIS — M25.562 CHRONIC PAIN OF LEFT KNEE: ICD-10-CM

## 2022-10-26 DIAGNOSIS — M25.462 EFFUSION OF LEFT KNEE: Primary | ICD-10-CM

## 2022-10-26 DIAGNOSIS — G89.29 CHRONIC PAIN OF LEFT KNEE: ICD-10-CM

## 2022-10-26 LAB — CRYSTALS SNV QL MICRO: NORMAL

## 2022-10-26 PROCEDURE — 20610 DRAIN/INJ JOINT/BURSA W/O US: CPT | Performed by: ORTHOPAEDIC SURGERY

## 2022-10-26 PROCEDURE — 99214 OFFICE O/P EST MOD 30 MIN: CPT | Performed by: ORTHOPAEDIC SURGERY

## 2022-10-26 NOTE — PROGRESS NOTES
Assessment/Plan:  1  Effusion of left knee  Body fluid culture and Gram stain    Synovial fluid, crystal    Lyme disease, PCR    Synovial fluid white cell count w/ diff    Large joint arthrocentesis: L knee   2  Chronic pain of left knee       Scribe Attestation    I,:  Javed Terryswetha am acting as a scribe while in the presence of the attending physician :       I,:  Isabella Schulte MD personally performed the services described in this documentation    as scribed in my presence :         Cindy underwent a fluid aspiration of her left knee due to significant swelling and 2+ edema about the knee  She is not tender upon palpation has functional range of motion of the knee however it is uncomfortable for her to the flex and extend  When I reviewed her MRI and her prior visit I did not believe she had an ACL or meniscus tear as reported on her MRI  Her exam was also benign for these conditions  She does have recent PT notes which I reviewed as well  Her PT notes report gradual improvements in symptoms with the ability to find some pain relief with home exercises  Cindy, however, does not feel better after PT which lead me to the discussion of a possible diagnostic arthroscopy of the knee evaluate for any possible abnormality that could be contributing to her symptoms  I also explained we could withdraw fluid from the knee during this procedure or in the office today to help with her swelling  This may also be done in the office today  She was amenable to this treatment plan and I aspirated 22 cc of fluid from her left knee joint  This will be sent for lab testing to evaluate for other possible conditions contributing to her symptoms with cell count and Gram stain and culture and crystals and Lyme  Even though her Lyme result was negative from previous testing, it is possible that it is present in the joint fluid  I do not want to proceed with the diagnostic arthroscopy until her lab results come back  She will follow up with me in 1 week repeat clinical evaluation and discussing further treatment options dependent on the results of her labs  Large joint arthrocentesis: L knee  Universal Protocol:  Consent: Verbal consent obtained  Risks and benefits: risks, benefits and alternatives were discussed  Consent given by: patient  Time out: Immediately prior to procedure a "time out" was called to verify the correct patient, procedure, equipment, support staff and site/side marked as required  Timeout called at: 10/26/2022 4:52 PM   Patient understanding: patient states understanding of the procedure being performed  Patient identity confirmed: verbally with patient    Supporting Documentation  Indications: joint swelling   Procedure Details  Location: knee - L knee  Needle size: 18 G  Ultrasound guidance: no  Approach: anterolateral    Aspirate: yellow and clear  Analysis: fluid sample sent for laboratory analysis    Patient tolerance: patient tolerated the procedure well with no immediate complications  Dressing:  Sterile dressing applied        Subjective:   Ellen Avila is a 38 y o  female who presents follow-up evaluation of her left knee  She reports minimal improvement since her visit  She received a Lyme titer, AILYN screen, C-reactive protein and sedimentation rate which all came back normal except for a slightly elevated C-reactive protein which is nonspecific  She was referred to physical therapy which provided small improvements in overall symptoms  She describes her symptoms as tightness and pressure rather than pain  She reports significant swelling in her leg  Over the weekend she increased her walking mileage which exacerbated her symptoms  Today she reports not doing well  She is unable to do ADLs with significant difficulty  She ambulates with a limp  Review of Systems   Constitutional: Negative for chills, fever and unexpected weight change     HENT: Negative for nosebleeds and sore throat  Eyes: Negative for pain, redness and visual disturbance  Respiratory: Negative for cough, shortness of breath and wheezing  Cardiovascular: Negative for chest pain, palpitations and leg swelling  Gastrointestinal: Negative for nausea and vomiting  Endocrine: Negative for polydipsia and polyuria  Genitourinary: Negative for dysuria and hematuria  Musculoskeletal: Positive for arthralgias, gait problem and joint swelling  As noted in HPI   Skin: Negative for rash and wound  Neurological: Negative for dizziness, numbness and headaches  Psychiatric/Behavioral: Negative for decreased concentration  The patient is not nervous/anxious            Past Medical History:   Diagnosis Date   • Anemia    • Arthritis    • Menorrhagia with regular cycle 2018    Added automatically from request for surgery [id number]       Past Surgical History:   Procedure Laterality Date   • ABDOMINAL SURGERY      2 c-sections   • ARTHROSCOPY KNEE Left 2016    Procedure: ARTHROSCOPY KNEE LATERAL aND MEDIAL MENISCECTOMY;  Surgeon: Nancy Webb MD;  Location: [place] [address] MAIN OR;  Service:    •  SECTION     • CHOLECYSTECTOMY      lap   • KNEE ARTHROSCOPY Left    • [place] HYSTEROSCOPY,W/ENDO BX N/A 2018    Procedure: DILATATION AND CURETTAGE (D&C) WITH HYSTEROSCOPY;  Surgeon: Donald Baca MD;  [facility];  Service: Gynecology   • [place] HYSTEROSCOPY,W/ENDOMETRIAL ABLATION N/A 2018    Procedure: Eagle Beckman;  Surgeon: Donald Baca MD;  [facility]: [facility];  Service: Gynecology       Family History   Problem Relation Age of Onset   • Hyperlipidemia Mother    • Hypertension Mother    • Breast cancer Mother    • Hypertension Father    • Heart disease Father    • Breast cancer Maternal Grandmother    • Ovarian cancer Paternal Grandmother        Social History     Occupational History   • Not on file   Tobacco Use   • Smoking status: Never Smoker   • Smokeless tobacco: Never Used   Substance and Sexual Activity   • Alcohol use: Yes     Comment: social, occasional   • Drug use: No   • Sexual activity: Yes     Partners: Male     Birth control/protection: None       No current outpatient medications on file  Allergies   Allergen Reactions   • Other Hives     Shrimp/shellfish   • Penicillin G Hives   • Pepcid [Famotidine] Chest Pain   • Shellfish Allergy - Food Allergy Hives and Edema       Objective:  Vitals:    10/26/22 1607   BP: 136/79   Pulse: (!) 114       Left Knee Exam     Range of Motion   Extension: 0   Flexion: 110     Other   Sensation: normal  Pulse: present  Swelling: mild  Effusion: effusion (2+ effusion) present          Observations   Left Knee   Positive for effusion (2+ effusion)  Physical Exam  Vitals reviewed  HENT:      Head: Normocephalic and atraumatic  Eyes:      General:         Right eye: No discharge  Left eye: No discharge  Conjunctiva/sclera: Conjunctivae normal       Pupils: Pupils are equal, round, and reactive to light  Cardiovascular:      Rate and Rhythm: Normal rate  Pulses: Normal pulses  Musculoskeletal:         General: Swelling present  No tenderness  Cervical back: Normal range of motion and neck supple  Left knee: Effusion (2+ effusion) present  Comments: As noted in HPI   Skin:     General: Skin is warm  Neurological:      Mental Status: She is alert  I have personally reviewed pertinent films in PACS and my interpretation is as follows: No imaging reviewed today  This document was created using speech voice recognition software  Grammatical errors, random word insertions, pronoun errors, and incomplete sentences are an occasional consequence of this system due to software limitations, ambient noise, and hardware issues     Any formal questions or concerns about content, text, or information contained within the body of this dictation should be directly addressed to the provider for clarification

## 2022-10-27 LAB
HISTIOCYTES NFR SNV MANUAL: 16 %
LYMPHOCYTES # SNV MANUAL: 41 %
MONOCYTES NFR SNV MANUAL: 30 %
NEUTROPHILS NFR SNV MANUAL: 5 %
PATHOLOGIST INTERPRETATION: NORMAL
PATHOLOGY REVIEW: YES
SITE: ABNORMAL
SYNOVIOCYTES NFR SNV: 7 %
TOTAL CELLS COUNTED SPEC: 100
UNCLASSIFIED CELLS % (SYNOVIAL): 1 %
WBC # FLD MANUAL: 454 /UL (ref 0–200)

## 2022-10-29 LAB
BACTERIA SPEC BFLD CULT: NO GROWTH
GRAM STN SPEC: NORMAL
GRAM STN SPEC: NORMAL

## 2022-10-31 LAB — B BURGDOR DNA SPEC QL NAA+PROBE: NEGATIVE

## 2022-11-04 ENCOUNTER — OFFICE VISIT (OUTPATIENT)
Dept: OBGYN CLINIC | Facility: CLINIC | Age: 42
End: 2022-11-04

## 2022-11-04 VITALS
HEIGHT: 61 IN | HEART RATE: 87 BPM | WEIGHT: 194 LBS | BODY MASS INDEX: 36.63 KG/M2 | DIASTOLIC BLOOD PRESSURE: 83 MMHG | SYSTOLIC BLOOD PRESSURE: 138 MMHG

## 2022-11-04 DIAGNOSIS — M23.92 ACUTE INTERNAL DERANGEMENT OF LEFT KNEE: Primary | ICD-10-CM

## 2022-11-04 DIAGNOSIS — M25.462 EFFUSION OF LEFT KNEE: ICD-10-CM

## 2022-11-04 DIAGNOSIS — M25.562 CHRONIC PAIN OF LEFT KNEE: ICD-10-CM

## 2022-11-04 DIAGNOSIS — G89.29 CHRONIC PAIN OF LEFT KNEE: ICD-10-CM

## 2022-11-04 NOTE — PROGRESS NOTES
Assessment/Plan:  1  Acute internal derangement of left knee  Case request operating room: ARTHROSCOPY KNEE    Case request operating room: ARTHROSCOPY KNEE    Ambulatory Referral to Physical Therapy    CANCELED: MRI knee left  wo contrast   2  Chronic pain of left knee  Case request operating room: ARTHROSCOPY KNEE    Case request operating room: ARTHROSCOPY KNEE    Ambulatory Referral to Physical Therapy   3  Effusion of left knee  Case request operating room: ARTHROSCOPY KNEE    Case request operating room: ARTHROSCOPY KNEE    Ambulatory Referral to Physical Therapy     Scribe Attestation    I,:  Mikel Hidalgo am acting as a scribe while in the presence of the attending physician :       I,:  Tera Kapoor MD personally performed the services described in this documentation    as scribed in my presence :           Germaine Yarbrough is here today for repeat evaluation of left knee  At her last visit I aspirated 22 cc's of fluid from her left knee  Lab results came back normal except for very slightly elevated white blood cell count at 452  Due to the ongoing nature of her symptoms and the exhaustion of nonoperative treatments the patient and I discussed a diagnostic arthroscopy  She was amenable to this  She understands that if I find a meniscus tear, I would deal with that in the form of a meniscectomy  The MRI had demonstrated what the radiologist called an ACL tear however she has not had feelings of instability about the knee and her examination for me has always been stable for Lachman's and anterior drawer testing  Thus, I would not be performing an ACL reconstruction if I find an ACL tear on her diagnostic arthroscopy  She understands this and does not wish to have an ACL reconstruction in any event at this point    Risks of the surgery are inclusive of but not limited to bleeding, infection, nerve injury, blood clot, worsening of symptoms, not achieving the anticipated results, persistent stiffness, weakness and the need for additional surgery  The patient verbally stated they understood those risks and would like to proceed with the surgery  Subjective:   Papito Hopper is a 38 y o  female who presents follow up evaluation of her left knee  Patient has been dealing with chronic swelling and stiffness of her left knee  At her last visit on 10/26/2022 her knee was aspirated and synovial fluid was sent for labs  Patient has also attended physical therapy which she states has only provided small improvements  She describes her symptoms as chronic and ongoing pressure around her knee  She states she has no pain  She notes difficulty getting up after sitting for prolonged period of time  She states no improvement of her symptoms since her aspiration  She is unable to complete activities of daily living without significant difficulty  She does ambulate with a limp  She denies any numbness or distal paresthesias  Review of Systems   Constitutional: Negative for chills and fever  HENT: Negative for ear pain and sore throat  Eyes: Negative for pain and visual disturbance  Respiratory: Negative for cough and shortness of breath  Cardiovascular: Negative for chest pain and palpitations  Gastrointestinal: Negative for abdominal pain and vomiting  Genitourinary: Negative for dysuria and hematuria  Musculoskeletal: Negative for arthralgias and back pain  Skin: Negative for color change and rash  Neurological: Negative for seizures and syncope  All other systems reviewed and are negative          Past Medical History:   Diagnosis Date   • Anemia    • Arthritis    • Menorrhagia with regular cycle 11/20/2018    Added automatically from request for surgery [id number]       Past Surgical History:   Procedure Laterality Date   • ABDOMINAL SURGERY      2 c-sections   • ARTHROSCOPY KNEE Left 12/30/2016    Procedure: ARTHROSCOPY KNEE LATERAL aND MEDIAL MENISCECTOMY;  Surgeon: Radha Fuchs MD;  Location: [facility] MAIN OR;  Service:    •  SECTION     • CHOLECYSTECTOMY      lap   • KNEE ARTHROSCOPY Left    • [place] HYSTEROSCOPY,W/ENDO BX N/A 2018    Procedure: DILATATION AND CURETTAGE (D&C) WITH HYSTEROSCOPY;  Surgeon: Jessica Morales MD;  [facility];  Service: Gynecology   • [place] HYSTEROSCOPY,W/ENDOMETRIAL ABLATION N/A 2018    Procedure: Tollie Organ;  Surgeon: Jessica Morales MD;  [facility]: [facility];  Service: Gynecology       Family History   Problem Relation Age of Onset   • Hyperlipidemia Mother    • Hypertension Mother    • Breast cancer Mother    • Hypertension Father    • Heart disease Father    • Breast cancer Maternal Grandmother    • Ovarian cancer Paternal Grandmother        Social History     Occupational History   • Not on file   Tobacco Use   • Smoking status: Never Smoker   • Smokeless tobacco: Never Used   Substance and Sexual Activity   • Alcohol use: Yes     Comment: social, occasional   • Drug use: No   • Sexual activity: Yes     Partners: Male     Birth control/protection: None       No current outpatient medications on file  Allergies   Allergen Reactions   • Other Hives     Shrimp/shellfish   • Penicillin G Hives   • Pepcid [Famotidine] Chest Pain   • Shellfish Allergy - Food Allergy Hives and Edema       Objective:  Vitals:    22 1416   BP: 138/83   Pulse: 87       Left Knee Exam     Tenderness   The patient is experiencing no tenderness  Range of Motion   Extension: 0   Flexion: 110     Tests   Varus: negative Valgus: negative  Lachman:  Anterior - negative      Drawer:  Anterior - negative         Other   Erythema: absent  Sensation: normal  Pulse: present  Swelling: none  Effusion: no effusion present          Observations   Left Knee   Negative for effusion  Physical Exam  Vitals and nursing note reviewed  HENT:      Head: Normocephalic     Eyes:      Extraocular Movements: Extraocular movements intact  Cardiovascular:      Rate and Rhythm: Normal rate and regular rhythm  Pulses: Normal pulses  Heart sounds: Normal heart sounds  Pulmonary:      Effort: Pulmonary effort is normal    Musculoskeletal:         General: Normal range of motion  Cervical back: Normal range of motion  Left knee: No effusion  Skin:     General: Skin is warm and dry  Neurological:      General: No focal deficit present  Mental Status: She is alert  Psychiatric:         Behavior: Behavior normal              This document was created using speech voice recognition software  Grammatical errors, random word insertions, pronoun errors, and incomplete sentences are an occasional consequence of this system due to software limitations, ambient noise, and hardware issues  Any formal questions or concerns about content, text, or information contained within the body of this dictation should be directly addressed to the provider for clarification

## 2022-11-04 NOTE — H&P (VIEW-ONLY)
Assessment/Plan:  1  Acute internal derangement of left knee  Case request operating room: ARTHROSCOPY KNEE    Case request operating room: ARTHROSCOPY KNEE    Ambulatory Referral to Physical Therapy    CANCELED: MRI knee left  wo contrast   2  Chronic pain of left knee  Case request operating room: ARTHROSCOPY KNEE    Case request operating room: ARTHROSCOPY KNEE    Ambulatory Referral to Physical Therapy   3  Effusion of left knee  Case request operating room: ARTHROSCOPY KNEE    Case request operating room: ARTHROSCOPY KNEE    Ambulatory Referral to Physical Therapy     Scribe Attestation    I,:  Gordo Diaz am acting as a scribe while in the presence of the attending physician :       I,:  Leann Kidd MD personally performed the services described in this documentation    as scribed in my presence :           Art Cavazos is here today for repeat evaluation of left knee  At her last visit I aspirated 22 cc's of fluid from her left knee  Lab results came back normal except for very slightly elevated white blood cell count at 452  Due to the ongoing nature of her symptoms and the exhaustion of nonoperative treatments the patient and I discussed a diagnostic arthroscopy  She was amenable to this  She understands that if I find a meniscus tear, I would deal with that in the form of a meniscectomy  The MRI had demonstrated what the radiologist called an ACL tear however she has not had feelings of instability about the knee and her examination for me has always been stable for Lachman's and anterior drawer testing  Thus, I would not be performing an ACL reconstruction if I find an ACL tear on her diagnostic arthroscopy  She understands this and does not wish to have an ACL reconstruction in any event at this point    Risks of the surgery are inclusive of but not limited to bleeding, infection, nerve injury, blood clot, worsening of symptoms, not achieving the anticipated results, persistent stiffness, weakness and the need for additional surgery  The patient verbally stated they understood those risks and would like to proceed with the surgery  Subjective:   Hugh Cruz is a 38 y o  female who presents follow up evaluation of her left knee  Patient has been dealing with chronic swelling and stiffness of her left knee  At her last visit on 10/26/2022 her knee was aspirated and synovial fluid was sent for labs  Patient has also attended physical therapy which she states has only provided small improvements  She describes her symptoms as chronic and ongoing pressure around her knee  She states she has no pain  She notes difficulty getting up after sitting for prolonged period of time  She states no improvement of her symptoms since her aspiration  She is unable to complete activities of daily living without significant difficulty  She does ambulate with a limp  She denies any numbness or distal paresthesias  Review of Systems   Constitutional: Negative for chills and fever  HENT: Negative for ear pain and sore throat  Eyes: Negative for pain and visual disturbance  Respiratory: Negative for cough and shortness of breath  Cardiovascular: Negative for chest pain and palpitations  Gastrointestinal: Negative for abdominal pain and vomiting  Genitourinary: Negative for dysuria and hematuria  Musculoskeletal: Negative for arthralgias and back pain  Skin: Negative for color change and rash  Neurological: Negative for seizures and syncope  All other systems reviewed and are negative          Past Medical History:   Diagnosis Date   • Anemia    • Arthritis    • Menorrhagia with regular cycle 11/20/2018    Added automatically from request for surgery [id number]       Past Surgical History:   Procedure Laterality Date   • ABDOMINAL SURGERY      2 c-sections   • ARTHROSCOPY KNEE Left 12/30/2016    Procedure: ARTHROSCOPY KNEE LATERAL aND MEDIAL MENISCECTOMY;  Surgeon: Alec Alvarez MD;  Location: [facility] MAIN OR;  Service:    •  SECTION     • CHOLECYSTECTOMY  2007    lap   • KNEE ARTHROSCOPY Left    • [place] HYSTEROSCOPY,W/ENDO BX N/A 2018    Procedure: DILATATION AND CURETTAGE (D&C) WITH HYSTEROSCOPY;  Surgeon: Fawad Esquivel MD;  [facility];  Service: Gynecology   • [place] HYSTEROSCOPY,W/ENDOMETRIAL ABLATION N/A 2018    Procedure: Azucena Gottron;  Surgeon: Fawad Esquivel MD;  [facility];  Service: Gynecology       Family History   Problem Relation Age of Onset   • Hyperlipidemia Mother    • Hypertension Mother    • Breast cancer Mother    • Hypertension Father    • Heart disease Father    • Breast cancer Maternal Grandmother    • Ovarian cancer Paternal Grandmother        Social History     Occupational History   • Not on file   Tobacco Use   • Smoking status: Never Smoker   • Smokeless tobacco: Never Used   Substance and Sexual Activity   • Alcohol use: Yes     Comment: social, occasional   • Drug use: No   • Sexual activity: Yes     Partners: Male     Birth control/protection: None       No current outpatient medications on file  Allergies   Allergen Reactions   • Other Hives     Shrimp/shellfish   • Penicillin G Hives   • Pepcid [Famotidine] Chest Pain   • Shellfish Allergy - Food Allergy Hives and Edema       Objective:  Vitals:    22 1416   BP: 138/83   Pulse: 87       Left Knee Exam     Tenderness   The patient is experiencing no tenderness  Range of Motion   Extension: 0   Flexion: 110     Tests   Varus: negative Valgus: negative  Lachman:  Anterior - negative      Drawer:  Anterior - negative         Other   Erythema: absent  Sensation: normal  Pulse: present  Swelling: none  Effusion: no effusion present          Observations   Left Knee   Negative for effusion  Physical Exam  Vitals and nursing note reviewed  HENT:      Head: Normocephalic     Eyes:      Extraocular Movements: Extraocular movements intact  Cardiovascular:      Rate and Rhythm: Normal rate and regular rhythm  Pulses: Normal pulses  Heart sounds: Normal heart sounds  Pulmonary:      Effort: Pulmonary effort is normal    Musculoskeletal:         General: Normal range of motion  Cervical back: Normal range of motion  Left knee: No effusion  Skin:     General: Skin is warm and dry  Neurological:      General: No focal deficit present  Mental Status: She is alert  Psychiatric:         Behavior: Behavior normal              This document was created using speech voice recognition software  Grammatical errors, random word insertions, pronoun errors, and incomplete sentences are an occasional consequence of this system due to software limitations, ambient noise, and hardware issues  Any formal questions or concerns about content, text, or information contained within the body of this dictation should be directly addressed to the provider for clarification

## 2022-11-09 ENCOUNTER — TELEPHONE (OUTPATIENT)
Dept: OBGYN CLINIC | Facility: CLINIC | Age: 42
End: 2022-11-09

## 2022-11-09 NOTE — TELEPHONE ENCOUNTER
Charu Falcon is requesting a letter for her work, as she is having surgery with Dr Robbins [facility]  I've asked her to call us to give a little more information as to what it needs to say  Awaiting her call back

## 2022-11-14 ENCOUNTER — TELEPHONE (OUTPATIENT)
Dept: OBGYN CLINIC | Facility: CLINIC | Age: 42
End: 2022-11-14

## 2022-11-14 DIAGNOSIS — M23.92 ACUTE INTERNAL DERANGEMENT OF LEFT KNEE: Primary | ICD-10-CM

## 2022-11-14 NOTE — TELEPHONE ENCOUNTER
Per Vanessa Hilton, requested to send work letter and PT Rx to Marlene, via e-mail  This has been sent to e-mail on file

## 2022-11-14 NOTE — TELEPHONE ENCOUNTER
Cindy's surgery has been denied  The request service is supported when the results of a detailed picture study do not show a problem in your knee which could be the cause of your symptoms  This could be MRI or CT Arthrogram   The records do not show these type of MRI or CT results  The requested service is NOT supported when an X-ray shows that you have Kellgren-Dennis grade 2 or greater OA  K-L is a grading system for OA seen with x-rays  Grade 2 or greater means that you have a least clear overgrowth of bones and possible narrowing of joint space  The records show that you have K-L grade 2 or greater seen on X-ray  The degree of arthritts on your x-ray is too advanced to support the requested service  PLEASE ADVISE  Thank you

## 2022-11-15 NOTE — TELEPHONE ENCOUNTER
I spoke with Cindy this morning  She is very unhappy to hear about this  She is asking to consider ordering another MRI as she feels that the previous facility where she had the MRI did not properly document all that was wrong with her knee  (Her insurance did not pay for the previous MRI, this was done at a facility where she worked, thus they should approve and cover a new study)

## 2022-11-15 NOTE — TELEPHONE ENCOUNTER
Caller: Cindy     Doctor: Matteo    Reason for call: Patient is returning your phone call  [facility] does not have all her clinicals  They did not have the same dates that she has  They do not have her MRI or PT notes or fluid results  They also need a peer to peer       Call back#: [phone]

## 2022-11-15 NOTE — TELEPHONE ENCOUNTER
Cindy has asked me to ask you to reconsider doing the peer to peer  IF NOT, please consider reordering the MRI

## 2022-11-16 NOTE — TELEPHONE ENCOUNTER
Dr Arnie Lerner called in for peer to peer today  Before transferring the call to Dr Sara Saldaña, Dr Bimal Dominique stated "if the doctor is busy, I can provide you the approval number, as I am able to approve this case"  Auth# provided  I reached out to [facility] and informed her that her surgery has now been authorized  She was very happy and greatly appreciated everyone's efforts

## 2022-11-17 RX ORDER — RIBOFLAVIN (VITAMIN B2) 100 MG
100 TABLET ORAL DAILY
COMMUNITY

## 2022-11-17 NOTE — PRE-PROCEDURE INSTRUCTIONS
My Surgical Experience    The following information was developed to assist you to prepare for your operation  What do I need to do before coming to the hospital?  • Arrange for a responsible person to drive you to and from the hospital   • Arrange care for your children at home  Children are not allowed in the recovery areas of the hospital  • Plan to wear clothing that is easy to put on and take off  If you are having shoulder surgery, wear a shirt that buttons or zippers in the front  Bathing  o Shower the evening before and the morning of your surgery with an antibacterial soap  Please refer to the Pre Op Showering Instructions for Surgery Patients Sheet   o Remove nail polish and all body piercing jewelry  o Do not shave any body part for at least 24 hours before surgery-this includes face, arms, legs and upper body  Food  o Nothing to eat or drink after midnight the night before your surgery  This includes candy and chewing gum  o Exception: If your surgery is after 12:00pm (noon), you may have clear liquids such as 7-Up®, ginger ale, apple or cranberry juice, Jell-O®, water, or clear broth until 8:00 am  o Do not drink milk or juice with pulp on the morning before surgery  o Do not drink alcohol 24 hours before surgery  Medicine  o Follow instructions you received from your surgeon about which medicines you may take on the day of surgery  o If instructed to take medicine on the morning of surgery, take pills with just a small sip of water  Call your prescribing doctor for specific infroamtion on what to do if you take insulin    What should I bring to the hospital?    Bring:  • Crutches or a walker, if you have them, for foot or knee surgery  • A list of the daily medicines, vitamins, minerals, herbals and nutritional supplements you take   Include the dosages of medicines and the time you take them each day  • Glasses, dentures or hearing aids  • Minimal clothing; you will be wearing hospital sleepwear  • Photo ID; required to verify your identity  • If you have a Living Will or Power of , bring a copy of the documents  • If you have an ostomy, bring an extra pouch and any supplies you use    Do not bring  • Medicines or inhalers  • Money, valuables or jewelry    What other information should I know about the day of surgery? • Notify your surgeons if you develop a cold, sore throat, cough, fever, rash or any other illness  • Report to the Ambulatory Surgical/Same Day Surgery Unit  • You will be instructed to stop at Registration only if you have not been pre-registered  • Inform your  fi they do not stay that they will be asked by the staff to leave a phone number where they can be reached  • Be available to be reached before surgery  In the event the operating room schedule changes, you may be asked to come in earlier or later than expected    *It is important to tell your doctor and others involved in your health care if you are taking or have been taking any non-prescription drugs, vitamins, minerals, herbals or other nutritional supplements  Any of these may interact with some food or medicines and cause a reaction      Pre-Surgery Instructions:   Medication Instructions   • Ascorbic Acid (vitamin C) 100 MG tablet Hold day of surgery

## 2022-11-20 ENCOUNTER — ANESTHESIA EVENT (OUTPATIENT)
Dept: PERIOP | Facility: HOSPITAL | Age: 42
End: 2022-11-20

## 2022-11-20 NOTE — ANESTHESIA PREPROCEDURE EVALUATION
Procedure:  ARTHROSCOPY KNEE (Left: Knee)    Relevant Problems   ANESTHESIA   (+) PONV (postoperative nausea and vomiting)      HEMATOLOGY   (+) Iron deficiency anemia due to chronic blood loss      MUSCULOSKELETAL   (+) Arthritis        Physical Exam    Airway    Mallampati score: II  TM Distance: >3 FB  Neck ROM: full     Dental       Cardiovascular  Rhythm: regular, Rate: normal,     Pulmonary  Breath sounds clear to auscultation,     Other Findings        Anesthesia Plan  ASA Score- 2     Anesthesia Type- general with ASA Monitors  Additional Monitors:   Airway Plan: LMA  Plan Factors-    Chart reviewed  Patient is not a current smoker  Induction- intravenous  Postoperative Plan- Plan for postoperative opioid use  Informed Consent- Anesthetic plan and risks discussed with patient  I personally reviewed this patient with the CRNA  Discussed and agreed on the Anesthesia Plan with the CRNA  Shey Borges

## 2022-11-22 ENCOUNTER — HOSPITAL ENCOUNTER (OUTPATIENT)
Facility: HOSPITAL | Age: 42
Setting detail: OUTPATIENT SURGERY
Discharge: HOME | End: 2022-11-22
Attending: ORTHOPAEDIC SURGERY | Admitting: ORTHOPAEDIC SURGERY

## 2022-11-22 ENCOUNTER — ANESTHESIA (OUTPATIENT)
Dept: PERIOP | Facility: HOSPITAL | Age: 42
End: 2022-11-22

## 2022-11-22 VITALS
RESPIRATION RATE: 18 BRPM | TEMPERATURE: 97.1 F | OXYGEN SATURATION: 95 % | BODY MASS INDEX: 36.63 KG/M2 | SYSTOLIC BLOOD PRESSURE: 162 MMHG | HEART RATE: 97 BPM | DIASTOLIC BLOOD PRESSURE: 72 MMHG | WEIGHT: 194 LBS | HEIGHT: 61 IN

## 2022-11-22 DIAGNOSIS — G89.29 CHRONIC PAIN OF LEFT KNEE: ICD-10-CM

## 2022-11-22 DIAGNOSIS — M23.92 ACUTE INTERNAL DERANGEMENT OF LEFT KNEE: ICD-10-CM

## 2022-11-22 DIAGNOSIS — M25.462 EFFUSION OF LEFT KNEE: ICD-10-CM

## 2022-11-22 DIAGNOSIS — M25.562 CHRONIC PAIN OF LEFT KNEE: ICD-10-CM

## 2022-11-22 PROBLEM — Z98.890 PONV (POSTOPERATIVE NAUSEA AND VOMITING): Status: ACTIVE | Noted: 2022-11-22

## 2022-11-22 PROBLEM — R11.2 PONV (POSTOPERATIVE NAUSEA AND VOMITING): Status: ACTIVE | Noted: 2022-11-22

## 2022-11-22 LAB
EXT PREGNANCY TEST URINE: NEGATIVE
EXT. CONTROL: NORMAL

## 2022-11-22 RX ORDER — FENTANYL CITRATE 50 UG/ML
INJECTION, SOLUTION INTRAMUSCULAR; INTRAVENOUS AS NEEDED
Status: DISCONTINUED | OUTPATIENT
Start: 2022-11-22 | End: 2022-11-22

## 2022-11-22 RX ORDER — ONDANSETRON 2 MG/ML
4 INJECTION INTRAMUSCULAR; INTRAVENOUS ONCE AS NEEDED
Status: DISCONTINUED | OUTPATIENT
Start: 2022-11-22 | End: 2022-11-22 | Stop reason: HOSPADM

## 2022-11-22 RX ORDER — ONDANSETRON 2 MG/ML
INJECTION INTRAMUSCULAR; INTRAVENOUS AS NEEDED
Status: DISCONTINUED | OUTPATIENT
Start: 2022-11-22 | End: 2022-11-22

## 2022-11-22 RX ORDER — LIDOCAINE HYDROCHLORIDE 10 MG/ML
INJECTION, SOLUTION EPIDURAL; INFILTRATION; INTRACAUDAL; PERINEURAL AS NEEDED
Status: DISCONTINUED | OUTPATIENT
Start: 2022-11-22 | End: 2022-11-22

## 2022-11-22 RX ORDER — DEXAMETHASONE SODIUM PHOSPHATE 10 MG/ML
INJECTION, SOLUTION INTRAMUSCULAR; INTRAVENOUS AS NEEDED
Status: DISCONTINUED | OUTPATIENT
Start: 2022-11-22 | End: 2022-11-22

## 2022-11-22 RX ORDER — CLINDAMYCIN PHOSPHATE 900 MG/50ML
900 INJECTION INTRAVENOUS ONCE
Status: COMPLETED | OUTPATIENT
Start: 2022-11-22 | End: 2022-11-22

## 2022-11-22 RX ORDER — FENTANYL CITRATE/PF 50 MCG/ML
25 SYRINGE (ML) INJECTION
Status: DISCONTINUED | OUTPATIENT
Start: 2022-11-22 | End: 2022-11-22 | Stop reason: HOSPADM

## 2022-11-22 RX ORDER — MAGNESIUM HYDROXIDE 1200 MG/15ML
LIQUID ORAL AS NEEDED
Status: DISCONTINUED | OUTPATIENT
Start: 2022-11-22 | End: 2022-11-22 | Stop reason: HOSPADM

## 2022-11-22 RX ORDER — KETOROLAC TROMETHAMINE 30 MG/ML
INJECTION, SOLUTION INTRAMUSCULAR; INTRAVENOUS AS NEEDED
Status: DISCONTINUED | OUTPATIENT
Start: 2022-11-22 | End: 2022-11-22

## 2022-11-22 RX ORDER — SODIUM CHLORIDE, SODIUM LACTATE, POTASSIUM CHLORIDE, CALCIUM CHLORIDE 600; 310; 30; 20 MG/100ML; MG/100ML; MG/100ML; MG/100ML
INJECTION, SOLUTION INTRAVENOUS CONTINUOUS PRN
Status: DISCONTINUED | OUTPATIENT
Start: 2022-11-22 | End: 2022-11-22

## 2022-11-22 RX ORDER — BUPIVACAINE HYDROCHLORIDE AND EPINEPHRINE 2.5; 5 MG/ML; UG/ML
INJECTION, SOLUTION INFILTRATION; PERINEURAL AS NEEDED
Status: DISCONTINUED | OUTPATIENT
Start: 2022-11-22 | End: 2022-11-22 | Stop reason: HOSPADM

## 2022-11-22 RX ORDER — PROPOFOL 10 MG/ML
INJECTION, EMULSION INTRAVENOUS AS NEEDED
Status: DISCONTINUED | OUTPATIENT
Start: 2022-11-22 | End: 2022-11-22

## 2022-11-22 RX ORDER — SODIUM CHLORIDE, SODIUM LACTATE, POTASSIUM CHLORIDE, CALCIUM CHLORIDE 600; 310; 30; 20 MG/100ML; MG/100ML; MG/100ML; MG/100ML
75 INJECTION, SOLUTION INTRAVENOUS CONTINUOUS
Status: DISCONTINUED | OUTPATIENT
Start: 2022-11-22 | End: 2022-11-22 | Stop reason: HOSPADM

## 2022-11-22 RX ORDER — MIDAZOLAM HYDROCHLORIDE 2 MG/2ML
INJECTION, SOLUTION INTRAMUSCULAR; INTRAVENOUS AS NEEDED
Status: DISCONTINUED | OUTPATIENT
Start: 2022-11-22 | End: 2022-11-22

## 2022-11-22 RX ADMIN — KETOROLAC TROMETHAMINE 30 MG: 30 INJECTION, SOLUTION INTRAMUSCULAR at 14:11

## 2022-11-22 RX ADMIN — LIDOCAINE HYDROCHLORIDE 50 MG: 10 INJECTION, SOLUTION EPIDURAL; INFILTRATION; INTRACAUDAL; PERINEURAL at 14:02

## 2022-11-22 RX ADMIN — FENTANYL CITRATE 25 MCG: 50 INJECTION INTRAMUSCULAR; INTRAVENOUS at 15:19

## 2022-11-22 RX ADMIN — CLINDAMYCIN PHOSPHATE 900 MG: 18 INJECTION, SOLUTION INTRAMUSCULAR; INTRAVENOUS at 13:59

## 2022-11-22 RX ADMIN — DEXAMETHASONE SODIUM PHOSPHATE 8 MG: 10 INJECTION, SOLUTION INTRAMUSCULAR; INTRAVENOUS at 14:06

## 2022-11-22 RX ADMIN — PROPOFOL 200 MG: 10 INJECTION, EMULSION INTRAVENOUS at 14:02

## 2022-11-22 RX ADMIN — FENTANYL CITRATE 50 MCG: 50 INJECTION, SOLUTION INTRAMUSCULAR; INTRAVENOUS at 14:03

## 2022-11-22 RX ADMIN — SODIUM CHLORIDE, SODIUM LACTATE, POTASSIUM CHLORIDE, AND CALCIUM CHLORIDE: .6; .31; .03; .02 INJECTION, SOLUTION INTRAVENOUS at 13:58

## 2022-11-22 RX ADMIN — ONDANSETRON 4 MG: 2 INJECTION INTRAMUSCULAR; INTRAVENOUS at 14:06

## 2022-11-22 RX ADMIN — FENTANYL CITRATE 50 MCG: 50 INJECTION, SOLUTION INTRAMUSCULAR; INTRAVENOUS at 14:09

## 2022-11-22 RX ADMIN — SODIUM CHLORIDE, POTASSIUM CHLORIDE, SODIUM LACTATE AND CALCIUM CHLORIDE 75 ML/HR: 600; 310; 30; 20 INJECTION, SOLUTION INTRAVENOUS at 13:11

## 2022-11-22 RX ADMIN — FENTANYL CITRATE 25 MCG: 50 INJECTION INTRAMUSCULAR; INTRAVENOUS at 14:59

## 2022-11-22 RX ADMIN — MIDAZOLAM 2 MG: 1 INJECTION INTRAMUSCULAR; INTRAVENOUS at 13:58

## 2022-11-22 NOTE — DISCHARGE INSTRUCTIONS
INSTRUCTIONS FOLLOWING YOUR KNEE ARTHROSCOPY    FIRST FOLLOW-UP VISIT AFTER SURGERY     Call the office the day after your surgery & make an appointment for 1 week to see Dr Em Paredes  At that appointment, your stitches will be removed  CANE OR CRUTCHES     You may put as much weight on your leg as tolerated when using the crutches/cane  When you feel that the crutches/cane is not necessary, you may discontinue its use  Use common sense, let pain be your guide for your activities  Climbing stairs, walking and sitting are all permitted as you tolerate them  EXERCISE PROGRAM     At your 1st follow-up visit you will be given a prescription for physical therapy if you have not already been given one  SHOWERING     Keep original bandage on for 48 hours after surgery & replace with band-aids  You should keep the band-aids on until you see Dr Em Paredes  After 48 hours, it is okay to get your incision wet & you may shower  Do not take any baths or soak in a hot tub or pool until your stitches have been removed  INCISION SITE     You may notice a small amount of blood on your bandage, about the size of a quarter, this is normal and there is no need to worry  If you notice uncontrollable or excessive bleeding, oozing, or redness of the wound please call the office  SWELLING AND DISCOMFORT     You will experience some pain and discomfort after surgery  You will be given a prescription for pain medication  Take the medication as prescribed  If the discomfort is mild, you can take 1 or 2 Tylenol, every 4-6 hours as needed, instead of the prescribed pain medication  You will notice some swelling of your knee after surgery, this is normal      To help reduce the swelling, elevate your leg as much as possible the first two days  In addition, you may place ice on your knee to reduce swelling  Place a plastic bag filled with ice, wrapped in a thin towel, on your knee   Do not keep ice on for more than 15-20 minutes, repeat 4-5 TIMES A DAY, IF POSSIBLE  BLOOD CLOT PREVENTION    Unless otherwise instructed, take one 325 mg aspirin daily for the first 3 weeks following surgery  This is to help decrease the risk of blood clots  If at any time you have discomfort, swelling, or redness in the calf (behind the leg between the knee and the ankle)  or difficulty breathing or heaviness in the chest, please call the doctor immediately  TEMPERATURE     A temperature of less than 101 degrees is common for the first 1-2 days after surgery  If your temperature is elevated above 101 degrees, call the office  IF YOU HAVE ANY OTHER CONCERNS OR QUESTIONS AT ANY TIME, DO NOT HESITATE TO CALL THE OFFICE [phone]

## 2022-11-22 NOTE — ANESTHESIA POSTPROCEDURE EVALUATION
Post-Op Assessment Note    CV Status:  Stable  Pain Score: 0    Pain management: adequate     Mental Status:  Awake   Hydration Status:  Stable   PONV Controlled:  None   Airway Patency:  Patent      Post Op Vitals Reviewed: Yes      Staff: Anesthesiologist         No notable events documented      /69 (11/22/22 1500)    Temp     Pulse 86 (11/22/22 1500)   Resp 16 (11/22/22 1500)    SpO2 95 % (11/22/22 1500)

## 2022-11-22 NOTE — OP NOTE
OPERATIVE REPORT  PATIENT NAME: Rachel Boyle    :  1980  MRN: [id number]  Pt Location: [facility] OR ROOM 04    SURGERY DATE: 2022    Surgeon(s) and Role:     * Beau Hermosillo MD - Primary     * Stephanie Mcguire PA-C - Assisting necessary for the procedure for assistance with improved visualization due to the minimally invasive arthroscopic techniques utilized for the operation for assistance utilization of the camera and shaver as well as assistance with the open biopsy of soft tissue in the gutter  Benton MOYER  And [facility] 2nd assistant    Preop Diagnosis:  Acute internal derangement of left knee [M23 92]  Chronic pain of left knee [M25 562, G89 29]  Effusion of left knee [M25 462]    Postop diagnosis:  Left knee effusion with significant synovitis and soft tissue abnormality about the lateral gutter and significant articular cartilage changes in all 3 compartments and meniscal tears noted along the posterior horn and body of the medial meniscus and cyclops lesion anterior to the ACL    Procedure:  Left knee arthroscopy with medial meniscectomy and open biopsy of lateral gutter soft tissue abnormality and limited debridement of cyclops lesion    Specimen(s):  ID Type Source Tests Collected by Time Destination   1 : SOFT TISSUE BIOPSY LEFT KNEE Tissue Soft Tissue, Other TISSUE EXAM Beau Hermosillo MD 2022  2:35 PM        Estimated Blood Loss:   Minimal    Drains:  * No LDAs found *    Anesthesia Type:   General    Operative Indications:  Acute internal derangement of left knee [M23 92]  Chronic pain of left knee [M25 562, G89 29]  Effusion of left knee [M25 462]  Cindy is a 78-year-old female who has been suffering long-term with left knee pain and effusion  We did perform physical therapy with her and also she had imaging studies that were inconclusive of pathology  She has significant disability with this left knee and wished to undergo left knee diagnostic arthroscopy  She understood the risks and benefits of that procedure wished to go ahead  Risks are inclusive but not limited to infection, stiffness, nerve or blood vessel injury causing numbness pain and weakness, blood clots, worsening of symptoms, failure to achieve anticipated results, failure to regain full strength and ability, and need for further surgery  Operative Findings:  Left knee exam under anesthesia demonstrated significant effusion  Range of motion was 0-110 preoperatively  Good stability to varus and valgus stress well as anterior and posterior drawer  Intra-articular findings demonstrate significant degenerative change with grade 4 changes in the trochlear groove as well as significant osteophytes particularly around the medial femoral condyle as well as less so on the lateral femoral condyle  ACL was intact  No obvious loose bodies were found however we did find an abnormality within the synovial soft tissue of the lateral gutter which we did utilize a separate portal that we made laterally to remove and sent off for pathology  The biopsy specimen was no more than approximately 3 mm in length  We did demonstrate significant degenerative changes as well along the medial femoral condyle and medial tibial plateau as well as along the lateral femoral condyle  These were least grade 3  Lateral meniscus demonstrated no significant signs of tearing  Medial meniscus demonstrated degenerative tearing along the posterior horn and body requiring debridement with a shaver to a stable rim of tissue  There was a cyclops lesion just anterior to the ACL which we removed quite readily  The ACL was nicely intact however  Significant synovitis was found throughout the knee  Complications:   None    Procedure and Technique:  Cindy was taken to the operating room and placed supine on the OR table  She was given preoperative IV antibiotics    General anesthesia was induced in the left knee was taken through exam under anesthesia as described above  Left lower extremity was then prepped and draped usual sterile fashion  Surgical time-out was taken  We injected local anesthetic in both anterior portals  Anterolateral portal made with 11 blade  Diagnostic arthroscopy begun an anteromedial portal made by 11 blade  We demonstrated significant synovitis as well as an effusion  Significant degenerative changes were found along the trochlear groove there were grade 4  Grade 2 changes were found along the undersurface of the patella  We did not find any obvious loose bodies however there was an abnormality in the lateral gutter that we created a lateral portal for and then excised it  It appeared to be a more tan region in the synovial tissue but may have been a loose body that had imbedded itself along the lateral gutter  It measured approximately 3 mm as we removed it with a grasper  We also demonstrated significant osteophytes along both the medial and lateral femoral condyles  The lateral compartment demonstrated grade 3 changes at least and possibly to grade 4 in some aspects  This was along the lateral femoral condyle in particular  No lateral meniscal tears were found  A cyclops lesion found anterior to an intact ACL graft  We did remove the cyclops lesion with the use of the Apollo  The ACL was nicely intact  The medial compartment demonstrated grade 4 changes in a fairly broad region along the medial femoral condyle  Medial meniscus demonstrated degenerative tearing along the posterior horn and body  We did debride that back to a stable rim of tissue with use of a shaver along the medial meniscus  We were pleased with our and result in this compartment  We also performed a chondroplasty on the medial femoral condyle to remove any loose articular cartilage  We did look extensively throughout the knee particularly along the gutters and posterior aspects and found no obvious signs of loose bodies  We then removed arthroscopic equipment and closed the portals with 4-0 nylon suture  Local anesthetic injected into the knee and dry, sterile dressings were applied with an Ace bandage  She tolerated procedure well and transferred to recovery room stable condition  She will follow up 1 week for suture removal   She will start physical therapy tomorrow  She will be on aspirin for DVT prophylaxis  We certainly need to help treat her for the degenerative changes that were found       I was present for the entire procedure and A qualified resident physician was not available    Patient Disposition:  PACU         SIGNATURE: Dariel Sy MD  DATE: November 22, 2022  TIME: 2:48 PM

## 2022-11-30 ENCOUNTER — OFFICE VISIT (OUTPATIENT)
Dept: OBGYN CLINIC | Facility: CLINIC | Age: 42
End: 2022-11-30

## 2022-11-30 DIAGNOSIS — M17.12 PRIMARY OSTEOARTHRITIS OF LEFT KNEE: ICD-10-CM

## 2022-11-30 DIAGNOSIS — Z98.890 STATUS POST ARTHROSCOPY OF LEFT KNEE: Primary | ICD-10-CM

## 2022-11-30 NOTE — PROGRESS NOTES
Assessment/Plan:  1  Status post arthroscopy of left knee        2  Primary osteoarthritis of left knee          Scribe Attestation    I,:  Iesha Laura am acting as a scribe while in the presence of the attending physician :       I,:  Jeromy Cortez MD personally performed the services described in this documentation    as scribed in my presence :           Cindy upon examination and review of the intraoperative photos appears to be doing well now 1 week status post left knee arthroscopy with medial meniscectomy, limited debridement and biopsy  The pathology report is still pending on the biopsy of the knee  However, her range of motion is within functional limits and demonstrates a trace effusion  I do believe this will continue to improve as time goes on  She is encouraged to continue physical therapy to work on range of motion overall strength of the knee and left lower extremity  Additionally, I did remark that she did demonstrate significant degenerative changes of the knee  She did have grade 4 changes of the trochlear groove as well as a osteophyte formation at the medial femoral condyle  This may indicate more invasive surgical procedures in the future  However, it is our hope that she can continue to maintain function of her knee without the need of a total knee arthroplasty in the near future  I did provide her a note dictating that she may return to work on 12/12/2022  I would like her to follow up with me in 6 weeks for repeat clinical evaluation  Subjective:   Campbell Cortez is a 38 y o  female who presents for evaluation of her left knee  She is now 8 days status post left knee arthroscopy with limited debridement, medial meniscectomy and biopsy  She states she is doing well and remarks that her pain is improving  She states her range of motion has greatly improved with the assistance of physical therapy    She denies any mechanical symptoms such as popping, clicking or locking of the knee  She states she does have some persistent swelling into the knee that does cause discomfort into her knee during weight-bearing activities  She has been out of work up to this point  Today she denies any distal paresthesias  Review of Systems   Constitutional: Negative for chills, fever and unexpected weight change  HENT: Negative for hearing loss, nosebleeds and sore throat  Eyes: Negative for pain, redness and visual disturbance  Respiratory: Negative for cough, shortness of breath and wheezing  Cardiovascular: Negative for chest pain, palpitations and leg swelling  Gastrointestinal: Negative for abdominal pain, nausea and vomiting  Endocrine: Negative for polydipsia and polyuria  Genitourinary: Negative for dysuria and hematuria  Musculoskeletal: Positive for arthralgias and myalgias  See HPI   Skin: Negative for rash and wound  Neurological: Negative for dizziness, numbness and headaches  Psychiatric/Behavioral: Negative for decreased concentration and suicidal ideas  The patient is not nervous/anxious            Past Medical History:   Diagnosis Date   • Anemia    • Arthritis    • Menorrhagia with regular cycle 2018    Added automatically from request for surgery [id number]       Past Surgical History:   Procedure Laterality Date   • ABDOMINAL SURGERY      2 c-sections   • ARTHROSCOPY KNEE Left 2016    Procedure: ARTHROSCOPY KNEE LATERAL aND MEDIAL MENISCECTOMY;  Surgeon: Dimitrios Chin MD;  Location: [facility] MAIN OR;  Service:    •  SECTION     • CHOLECYSTECTOMY      lap   • KNEE ARTHROSCOPY Left    • CA HYSTEROSCOPY,W/ENDO BX N/A 2018    Procedure: DILATATION AND CURETTAGE (D&C) WITH HYSTEROSCOPY;  Surgeon: Sharifa Oleary MD;  Location: [facility] MAIN OR;  Service: Gynecology   • CA HYSTEROSCOPY,W/ENDOMETRIAL ABLATION N/A 2018    Procedure: Leni Escamilla;  Surgeon: Sharifa Oleary MD;  Location: [facility] MAIN OR;  Service: Gynecology   • NC KNEE [address] Left 11/22/2022    Procedure: ARTHROSCOPY KNEE, LIMITED DEBRIDEMENT, BIOPSY, MEDIAL MENISCETOMY;  Surgeon: Anni Miranda MD;  Location: [facility] MAIN OR;  Service: Orthopedics       Family History   Problem Relation Age of Onset   • Hyperlipidemia Mother    • Hypertension Mother    • Breast cancer Mother    • Hypertension Father    • Heart disease Father    • Breast cancer Maternal Grandmother    • Ovarian cancer Paternal Grandmother        Social History     Occupational History   • Not on file   Tobacco Use   • Smoking status: Never   • Smokeless tobacco: Never   Substance and Sexual Activity   • Alcohol use: Yes     Comment: social, occasional   • Drug use: No   • Sexual activity: Yes     Partners: Male     Birth control/protection: None         Current Outpatient Medications:   •  Ascorbic Acid (vitamin C) 100 MG tablet, Take 100 mg by mouth daily, Disp: , Rfl:     Allergies   Allergen Reactions   • Other Hives     Shrimp/shellfish   • Penicillin G Hives   • Pepcid [Famotidine] Chest Pain   • Shellfish Allergy - Food Allergy Hives and Edema       Objective: There were no vitals filed for this visit  Left Knee Exam     Tenderness   Left knee tenderness location: anterior  Range of Motion   Extension: 0   Flexion: 120     Tests   Varus: negative Valgus: negative    Other   Erythema: absent  Scars: present (Portal incisions are clean, dry and intact)  Sensation: normal  Pulse: present  Effusion: effusion (Trace) present          Observations   Left Knee   Positive for effusion (Trace)  Physical Exam  Vitals reviewed  HENT:      Head: Normocephalic and atraumatic  Eyes:      General:         Right eye: No discharge  Left eye: No discharge  Conjunctiva/sclera: Conjunctivae normal       Pupils: Pupils are equal, round, and reactive to light  Cardiovascular:      Rate and Rhythm: Normal rate     Pulmonary:      Effort: Pulmonary effort is normal  No respiratory distress  Musculoskeletal:      Cervical back: Normal range of motion and neck supple  Left knee: Effusion (Trace) present  Comments: As noted in HPI   Skin:     General: Skin is warm and dry  Neurological:      Mental Status: She is alert and oriented to person, place, and time  I have personally reviewed pertinent films in PACS and my interpretation is as follows: No images reviewed today      This document was created using speech voice recognition software  Grammatical errors, random word insertions, pronoun errors, and incomplete sentences are an occasional consequence of this system due to software limitations, ambient noise, and hardware issues  Any formal questions or concerns about content, text, or information contained within the body of this dictation should be directly addressed to the provider for clarification

## 2022-11-30 NOTE — LETTER
November 30, 2022     Patient: Neptali Valencia  YOB: 1980  Date of Visit: 11/30/2022      To Whom it May Concern:    Neptali Valencia is under my professional care  Cindy was seen in my office on 11/30/2022  Dianaselin Bedoya may return to work on 12/12/22  If you have any questions or concerns, please don't hesitate to call           Sincerely,          Jaimee Woodson MD        CC: No Recipients

## 2022-12-01 ENCOUNTER — TELEPHONE (OUTPATIENT)
Dept: OBGYN CLINIC | Facility: CLINIC | Age: 42
End: 2022-12-01

## 2022-12-01 NOTE — RESULT ENCOUNTER NOTE
I did call Cindy and let her know that the pathology from her knee tissue was benign fibro cartilage  She was appreciative of the phone call  No malignancy found

## 2022-12-01 NOTE — TELEPHONE ENCOUNTER
----- Message from Chris Bazan MD sent at 12/1/2022  3:37 PM EST -----  I did call Cindy and let her know that the pathology from her knee tissue was benign fibro cartilage  She was appreciative of the phone call  No malignancy found

## 2022-12-30 ENCOUNTER — TELEPHONE (OUTPATIENT)
Dept: OBGYN CLINIC | Facility: CLINIC | Age: 42
End: 2022-12-30

## 2022-12-30 DIAGNOSIS — Z80.3 FAMILY HISTORY OF BREAST CANCER: Primary | ICD-10-CM

## 2022-12-30 NOTE — TELEPHONE ENCOUNTER
Patient called said the facility she goes to for her mammo cannot perform the ABUS with her mammo so she is requesting a slip without it please

## 2023-01-03 ENCOUNTER — TELEPHONE (OUTPATIENT)
Dept: OBGYN CLINIC | Facility: CLINIC | Age: 43
End: 2023-01-03

## 2023-01-04 ENCOUNTER — TELEPHONE (OUTPATIENT)
Dept: OBGYN CLINIC | Facility: CLINIC | Age: 43
End: 2023-01-04

## 2023-01-04 NOTE — TELEPHONE ENCOUNTER
PT needs PA for cat scan  [facility] code [id number]  Dx code [id number] [phone]    Phone number [phone]

## 2023-01-04 NOTE — TELEPHONE ENCOUNTER
Pt called and left message stating MRI was being denied  I don't see any orders from MRI, just the breast ultrasound  Would like a phone call to go over this

## 2023-01-09 ENCOUNTER — TELEPHONE (OUTPATIENT)
Dept: OBGYN CLINIC | Facility: CLINIC | Age: 43
End: 2023-01-09

## 2023-01-11 ENCOUNTER — TELEPHONE (OUTPATIENT)
Dept: OBGYN CLINIC | Facility: CLINIC | Age: 43
End: 2023-01-11

## 2023-01-19 ENCOUNTER — TELEPHONE (OUTPATIENT)
Dept: OBGYN CLINIC | Facility: CLINIC | Age: 43
End: 2023-01-19

## 2023-01-19 NOTE — TELEPHONE ENCOUNTER
Patient called regarding her denial for MRI of breast     Needs to be faxed to Reconsideration department @ [phone]  Needs to state + for BRCA gene  And strong family history       Also, the MRI needs to be with Contrast

## 2023-01-19 NOTE — LETTER
RE: Kathleen Gant  1980    Date : 2023        To Whom it May Concern:      I am writing this letter on behalf of Kathleen Gant, requesting Reconsideration for the denial for CPT Code 83012 MRI of breast with and without contrast(dye) that includes (CAD) Computer -Aided Detection   I have attached clinical information to be reviewed in making the reconsideration : Documentation of a Strong Family history of Breast Caner , and POSITIVE Genetic test for BARD1 Gen mutation   Also recommendation from genetic counselor suggestion and MRI of the breast due to strong family history of breast cancer   Please feel free to contact me at [phone] or Fax at [phone], in regards to this matter           Thank [facility]

## 2023-01-19 NOTE — TELEPHONE ENCOUNTER
Request for reconsideration faxed to Reconsideration Department at [phone]  With fax I attached clinicals with documentation of strong family history of breast cancer and documentation of positive genetic testing of BRAD1 and recommendation of Breast MRI from [facility]    ANNIE ROGERS

## 2023-02-08 ENCOUNTER — OFFICE VISIT (OUTPATIENT)
Dept: OBGYN CLINIC | Facility: CLINIC | Age: 43
End: 2023-02-08

## 2023-02-08 VITALS — DIASTOLIC BLOOD PRESSURE: 91 MMHG | HEART RATE: 74 BPM | SYSTOLIC BLOOD PRESSURE: 133 MMHG

## 2023-02-08 DIAGNOSIS — Z98.890 STATUS POST ARTHROSCOPY OF LEFT KNEE: Primary | ICD-10-CM

## 2023-02-08 NOTE — PROGRESS NOTES
Assessment/Plan:  1  Status post arthroscopy of left knee          Scribe Attestation    I,:  Nancy Hudson am acting as a scribe while in the presence of the attending physician :       I,:  Franki Cheatham MD personally performed the services described in this documentation    as scribed in my presence :         I am very pleased with Cindy's presentation today  He presents with full range of motion and a stable knee globally about the knee  I have no restrictions for her today  I do recommend staying physically fit and attending the gym regularly to strengthen her knee and maintain stability  She may also work fully as tolerated  I will see her back on an as-needed basis  Subjective:   Michelle Dale is a 38 y o  female who presents for follow up evaluation of her left knee  She is 11 weeks status post left knee arthroscopy with limited debridement, biopsy, and medial meniscectomy  She states she is doing well today  She recently finished with physical therapy and has been working out on her own  She denies pain, but does report stiffness with periods of prolonged sitting such as long car rides  She is able to walk normally and use the stairs  She has no questions or concerns today  Review of Systems   Constitutional: Negative for chills, fever and unexpected weight change  HENT: Negative for nosebleeds and sore throat  Eyes: Negative for pain, redness and visual disturbance  Respiratory: Negative for cough, shortness of breath and wheezing  Cardiovascular: Negative for chest pain, palpitations and leg swelling  Gastrointestinal: Negative for nausea and vomiting  Endocrine: Negative for polydipsia and polyuria  Genitourinary: Negative for dysuria and hematuria  Musculoskeletal: Negative for arthralgias and myalgias  See HPI   Skin: Negative for rash and wound  Neurological: Negative for dizziness, numbness and headaches     Psychiatric/Behavioral: Negative for decreased concentration  The patient is not nervous/anxious            Past Medical History:   Diagnosis Date   • Anemia    • Arthritis    • BARD1 gene mutation positive    • Menorrhagia with regular cycle 2018    Added automatically from request for surgery [id number]       Past Surgical History:   Procedure Laterality Date   • ABDOMINAL SURGERY      2 c-sections   • ARTHROSCOPY KNEE Left 2016    Procedure: ARTHROSCOPY KNEE LATERAL aND MEDIAL MENISCECTOMY;  Surgeon: Kristian Pulido MD;  Location: [facility] MAIN OR;  Service:    •  SECTION     • CHOLECYSTECTOMY      lap   • KNEE ARTHROSCOPY Left    • DE ARTHROSCOPY KNEE DIAGNOSTIC W/WO SYNOVIAL BX SPX Left 2022    Procedure: ARTHROSCOPY KNEE, LIMITED DEBRIDEMENT, BIOPSY, MEDIAL MENISCETOMY;  Surgeon: Fuentes Peterson MD;  Location: [address] [place] [address];  Service: Orthopedics   • DE HYSTEROSCOPY BX ENDOMETRIUM&/POLYPC W/WO D&C N/A 2018    Procedure: DILATATION AND CURETTAGE (D&C) WITH HYSTEROSCOPY;  Surgeon: Genoveva Mcpherson MD;  [facility];  Service: Gynecology   • DE HYSTEROSCOPY ENDOMETRIAL ABLATION N/A 2018    Procedure: ABLATION ENDOMETRIAL ENEDELIA;  Surgeon: Genoveva Mcpherson MD;  [facility]: [facility];  Service: Gynecology       Family History   Problem Relation Age of Onset   • Hyperlipidemia Mother    • Hypertension Mother    • Breast cancer Mother    • Hypertension Father    • Heart disease Father    • Breast cancer Maternal Grandmother    • Ovarian cancer Paternal Grandmother        Social History     Occupational History   • Not on file   Tobacco Use   • Smoking status: Never   • Smokeless tobacco: Never   Vaping Use   • Vaping Use: Never used   Substance and Sexual Activity   • Alcohol use: Yes     Comment: social, occasional   • Drug use: No   • Sexual activity: Yes     Partners: Male     Birth control/protection: None         Current Outpatient Medications:   •  Ascorbic Acid (vitamin C) 100 MG tablet, Take 100 mg by mouth daily, Disp: , Rfl:     Allergies   Allergen Reactions   • Other Hives     Shrimp/shellfish   • Penicillin G Hives   • Pepcid [Famotidine] Chest Pain   • Shellfish Allergy - Food Allergy Hives and Edema       Objective:  Vitals:    02/08/23 1645   BP: 133/91   Pulse: 74       Left Knee Exam     Range of Motion   Extension: 0   Flexion: 120     Tests   Varus: negative Valgus: negative  Drawer:  Anterior - negative     Posterior - negative            Physical Exam  Vitals reviewed  HENT:      Head: Normocephalic and atraumatic  Eyes:      General:         Right eye: No discharge  Left eye: No discharge  Conjunctiva/sclera: Conjunctivae normal    Cardiovascular:      Rate and Rhythm: Normal rate  Pulmonary:      Effort: Pulmonary effort is normal  No respiratory distress  Musculoskeletal:         General: No swelling  Normal range of motion  Cervical back: Normal range of motion and neck supple  Comments: See HPI   Skin:     General: Skin is warm  Neurological:      Mental Status: She is alert  I have personally reviewed pertinent films in PACS and my interpretation is as follows: No imaging to review  This document was created using speech voice recognition software  Grammatical errors, random word insertions, pronoun errors, and incomplete sentences are an occasional consequence of this system due to software limitations, ambient noise, and hardware issues  Any formal questions or concerns about content, text, or information contained within the body of this dictation should be directly addressed to the provider for clarification

## 2023-06-06 ENCOUNTER — OFFICE VISIT (OUTPATIENT)
Dept: OBGYN CLINIC | Facility: CLINIC | Age: 43
End: 2023-06-06
Payer: COMMERCIAL

## 2023-06-06 VITALS
SYSTOLIC BLOOD PRESSURE: 120 MMHG | HEART RATE: 68 BPM | WEIGHT: 191 LBS | HEIGHT: 61 IN | DIASTOLIC BLOOD PRESSURE: 80 MMHG | BODY MASS INDEX: 36.06 KG/M2

## 2023-06-06 DIAGNOSIS — Z98.890 STATUS POST ARTHROSCOPY OF LEFT KNEE: Primary | ICD-10-CM

## 2023-06-06 DIAGNOSIS — M17.12 PRIMARY OSTEOARTHRITIS OF LEFT KNEE: ICD-10-CM

## 2023-06-06 PROCEDURE — 99213 OFFICE O/P EST LOW 20 MIN: CPT | Performed by: PHYSICIAN ASSISTANT

## 2023-06-06 PROCEDURE — 20610 DRAIN/INJ JOINT/BURSA W/O US: CPT | Performed by: PHYSICIAN ASSISTANT

## 2023-06-06 RX ORDER — DEXAMETHASONE SODIUM PHOSPHATE 10 MG/ML
40 INJECTION, SOLUTION INTRAMUSCULAR; INTRAVENOUS
Status: COMPLETED | OUTPATIENT
Start: 2023-06-06 | End: 2023-06-06

## 2023-06-06 RX ADMIN — DEXAMETHASONE SODIUM PHOSPHATE 40 MG: 10 INJECTION, SOLUTION INTRAMUSCULAR; INTRAVENOUS at 10:15

## 2023-06-06 NOTE — PROGRESS NOTES
Assessment/Plan:  1  Status post arthroscopy of left knee        2  Primary osteoarthritis of left knee          The patient does have significant arthritis of the left knee noted at the time of arthroscopy  As she has responded well to steroid injection in the past, I did provide her with another injection today  She tolerated this well  I advised ice and over-the-counter medications as needed for discomfort  Is my hope this injection will provide her some relief prior to her vacation in 2 weeks  She will follow-up as needed  Subjective:   Antonio Hartmann is a 38 y o  female who presents today for follow-up of her left knee, status post arthroscopy back in November  She did have significant arthritic changes noted at the time of arthroscopy  Patient had actually been doing quite well up until about 6 weeks ago when she overdid it with her activity  She does increased pain and swelling since that time  She did have 1 steroid injection in the past which was prior to her arthroscopy  She notes this did help her some  She is inquiring about another steroid injection today as she does have an upcoming trip to [place] in 2 weeks  She will be doing a lot of hiking and walking for this trip  Review of Systems   Constitutional: Negative  Negative for chills and fever  HENT: Negative  Negative for ear pain and sore throat  Eyes: Negative  Negative for pain and redness  Respiratory: Negative  Negative for shortness of breath and wheezing  Cardiovascular: Negative for chest pain and palpitations  Gastrointestinal: Negative  Negative for abdominal pain and blood in stool  Endocrine: Negative  Negative for polydipsia and polyuria  Genitourinary: Negative  Negative for difficulty urinating and dysuria  Musculoskeletal:        As noted in HPI   Skin: Negative  Negative for pallor and rash  Neurological: Negative  Negative for dizziness and numbness  Hematological: Negative  Negative for adenopathy  Does not bruise/bleed easily  Psychiatric/Behavioral: Negative  Negative for confusion and suicidal ideas           Past Medical History:   Diagnosis Date   • Anemia    • Arthritis    • BARD1 gene mutation positive    • Menorrhagia with regular cycle 2018    Added automatically from request for surgery [id number]       Past Surgical History:   Procedure Laterality Date   • ABDOMINAL SURGERY      2 c-sections   • ARTHROSCOPY KNEE Left 2016    Procedure: ARTHROSCOPY KNEE LATERAL aND MEDIAL MENISCECTOMY;  Surgeon: Audrey Pace MD;  Location: [facility] MAIN OR;  Service:    •  SECTION     • CHOLECYSTECTOMY      lap   • KNEE ARTHROSCOPY Left    • [place] ARTHROSCOPY KNEE DIAGNOSTIC W/WO SYNOVIAL BX SPX Left 2022    Procedure: ARTHROSCOPY KNEE, LIMITED DEBRIDEMENT, BIOPSY, MEDIAL MENISCETOMY;  Surgeon: Norma Coffey MD;  Location: [address] [place] [address];  Service: Orthopedics   • [place] HYSTEROSCOPY BX ENDOMETRIUM&/POLYPC W/WO D&C N/A 2018    Procedure: DILATATION AND CURETTAGE (D&C) WITH HYSTEROSCOPY;  Surgeon: Luzmaria Henry MD;  Location:  MAIN OR;  Service: Gynecology   • KS HYSTEROSCOPY ENDOMETRIAL ABLATION N/A 2018    Procedure: ABLATION ENDOMETRIAL ENEDELIA;  Surgeon: Luzmaria Henry MD;  [facility]: [facility] MAIN [facility];  Service: Gynecology       Family History   Problem Relation Age of Onset   • Hyperlipidemia Mother    • Hypertension Mother    • Breast cancer Mother    • Hypertension Father    • Heart disease Father    • Breast cancer Maternal Grandmother    • Ovarian cancer Paternal Grandmother        Social History     Occupational History   • Not on file   Tobacco Use   • Smoking status: Never   • Smokeless tobacco: Never   Vaping Use   • Vaping Use: Never used   Substance and Sexual Activity   • Alcohol use: Yes     Comment: social, occasional   • Drug use: No   • Sexual activity: Yes     Partners: Male     Birth control/protection: None         Current "Outpatient Medications:   •  Ascorbic Acid (vitamin C) 100 MG tablet, Take 100 mg by mouth daily, Disp: , Rfl:     Allergies   Allergen Reactions   • Other Hives     Shrimp/shellfish   • Penicillin G Hives   • Pepcid [Famotidine] Chest Pain   • Shellfish Allergy - Food Allergy Hives and Edema       Objective:  Vitals:    06/06/23 1001   BP: 120/80   Pulse: 68     Pain Score:   4      Left Knee Exam     Tenderness   The patient is experiencing tenderness in the medial joint line and lateral joint line  Range of Motion   Extension: 0   Flexion: 120     Tests   Varus: negative Valgus: negative  Drawer:  Anterior - negative     Posterior - negative    Other   Erythema: absent  Sensation: normal  Pulse: present  Effusion: effusion (1+) present          Observations   Left Knee   Positive for effusion (1+)  Physical Exam  Constitutional:       General: She is not in acute distress  Appearance: She is well-developed  HENT:      Head: Normocephalic and atraumatic  Eyes:      General: No scleral icterus  Conjunctiva/sclera: Conjunctivae normal    Neck:      Vascular: No JVD  Cardiovascular:      Rate and Rhythm: Normal rate  Pulmonary:      Effort: Pulmonary effort is normal  No respiratory distress  Musculoskeletal:      Left knee: Effusion (1+) present  Skin:     General: Skin is warm  Neurological:      Mental Status: She is alert and oriented to person, place, and time  Coordination: Coordination normal        Large joint arthrocentesis: L knee  Universal Protocol:  Risks and benefits: risks, benefits and alternatives were discussed  Consent given by: patient  Time out: Immediately prior to procedure a \"time out\" was called to verify the correct patient, procedure, equipment, support staff and site/side marked as required    Timeout called at: 6/6/2023 10:38 AM   Site marked: the operative site was marked  Supporting Documentation  Indications: pain   Procedure Details  Location: knee - " L knee  Preparation: Patient was prepped and draped in the usual sterile fashion  Needle size: 22 G  Ultrasound guidance: no  Approach: anterolateral  Medications administered: 40 mg dexamethasone 100 mg/10 mL (4 ml  5% naropin)    Patient tolerance: patient tolerated the procedure well with no immediate complications  Dressing:  Sterile dressing applied            This document was created using speech voice recognition software  Grammatical errors, random word insertions, pronoun errors, and incomplete sentences are an occasional consequence of this system due to software limitations, ambient noise, and hardware issues  Any formal questions or concerns about content, text, or information contained within the body of this dictation should be directly addressed to the provider for clarification

## 2023-07-03 NOTE — INTERVAL H&P NOTE
H&P reviewed  After examining the patient I find no changes in the patients condition since the H&P had been written  Griseofulvin Counseling:  I discussed with the patient the risks of griseofulvin including but not limited to photosensitivity, cytopenia, liver damage, nausea/vomiting and severe allergy.  The patient understands that this medication is best absorbed when taken with a fatty meal (e.g., ice cream or french fries).

## 2023-07-28 ENCOUNTER — TELEPHONE (OUTPATIENT)
Age: 43
End: 2023-07-28

## 2023-10-26 ENCOUNTER — OFFICE VISIT (OUTPATIENT)
Dept: GASTROENTEROLOGY | Facility: CLINIC | Age: 43
End: 2023-10-26
Payer: COMMERCIAL

## 2023-10-26 VITALS
WEIGHT: 190 LBS | BODY MASS INDEX: 37.3 KG/M2 | HEIGHT: 60 IN | DIASTOLIC BLOOD PRESSURE: 101 MMHG | HEART RATE: 81 BPM | SYSTOLIC BLOOD PRESSURE: 144 MMHG

## 2023-10-26 DIAGNOSIS — Z83.719 FAMILY HISTORY OF COLONIC POLYPS: ICD-10-CM

## 2023-10-26 DIAGNOSIS — K22.81 ESOPHAGEAL POLYP: Primary | ICD-10-CM

## 2023-10-26 DIAGNOSIS — K63.5 POLYP OF COLON, UNSPECIFIED PART OF COLON, UNSPECIFIED TYPE: ICD-10-CM

## 2023-10-26 DIAGNOSIS — K59.01 SLOW TRANSIT CONSTIPATION: ICD-10-CM

## 2023-10-26 PROCEDURE — 99204 OFFICE O/P NEW MOD 45 MIN: CPT | Performed by: INTERNAL MEDICINE

## 2023-10-26 RX ORDER — ALBUTEROL SULFATE 90 UG/1
AEROSOL, METERED RESPIRATORY (INHALATION)
COMMUNITY
Start: 2023-09-14

## 2023-10-26 RX ORDER — LINACLOTIDE 72 UG/1
1 CAPSULE, GELATIN COATED ORAL
Qty: 30 CAPSULE | Refills: 5 | Status: SHIPPED | OUTPATIENT
Start: 2023-10-26

## 2023-10-26 RX ORDER — PREDNISONE 10 MG/1
TABLET ORAL
COMMUNITY
Start: 2023-10-22

## 2023-10-26 RX ORDER — DOXYCYCLINE HYCLATE 100 MG
100 TABLET ORAL 2 TIMES DAILY
COMMUNITY
Start: 2023-10-22 | End: 2023-11-01

## 2023-10-26 NOTE — H&P (VIEW-ONLY)
[facility] - Outpatient Consultation  Roya Antonio 37 y.o. female MRN: [id number]  Encounter: [id number]          ASSESSMENT AND PLAN:      1. Esophageal polyp  Additionally distant history of intestinal metaplasia of the antrum  - EGD; Future    2. Polyp of colon, unspecified part of colon, unspecified type  Last colonoscopy 8 years ago. - Colonoscopy; Future    3. Slow transit constipation    - Colonoscopy; Future  - linaCLOtide (Linzess) 72 MCG CAPS; Take 1 capsule by mouth daily before breakfast  Dispense: 30 capsule; Refill: 5    4. Family history of colonic polyps  Mother has history of polyps. 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 She presents to schedule EGD colonoscopy. Apparently has had polyps of the esophagus requires follow-up additionally family history of colon polyps she is now 37 she is here for scheduling colonoscopy. She does have some underlying constipation and desires a Linzess refill. Last colonoscopy 8 years ago she only has occasional constipation uses Linzess. Last upper endoscopy several years ago follow-up of esophageal polyp and has a distant history of intestinal metaplasia of the antrum. Mother has polyps otherwise family history noncontributory. REVIEW OF SYSTEMS:    CONSTITUTIONAL: Denies any fever, chills, rigors, and weight loss. HEENT: No earache or tinnitus. Denies hearing loss or visual disturbances. CARDIOVASCULAR: No chest pain or palpitations. RESPIRATORY: Denies any cough, hemoptysis, shortness of breath or dyspnea on exertion. GASTROINTESTINAL: As noted in the History of Present Illness. GENITOURINARY: No problems with urination. Denies any hematuria or dysuria. NEUROLOGIC: No dizziness or vertigo, denies headaches.    MUSCULOSKELETAL: Denies any muscle or joint pain. SKIN: Denies skin rashes or itching. ENDOCRINE: Denies excessive thirst. Denies intolerance to heat or cold. PSYCHOSOCIAL: Denies depression or anxiety. Denies any recent memory loss.        Historical Information   Past Medical History:   Diagnosis Date    Anemia     Arthritis     BARD1 gene mutation positive     Menorrhagia with regular cycle 2018    Added automatically from request for surgery [id number]     Past Surgical History:   Procedure Laterality Date    ABDOMINAL SURGERY      2 c-sections    ARTHROSCOPY KNEE Left 2016    Procedure: ARTHROSCOPY KNEE LATERAL aND MEDIAL MENISCECTOMY;  Surgeon: Jaymie Tracey MD;  Location: [address] [place] [address] MAIN OR;  Service:      SECTION      CHOLECYSTECTOMY      lap    KNEE ARTHROSCOPY Left     [place] ARTHROSCOPY KNEE DIAGNOSTIC W/WO SYNOVIAL BX SPX Left 2022    Procedure: ARTHROSCOPY KNEE, LIMITED DEBRIDEMENT, BIOPSY, MEDIAL MENISCETOMY;  Surgeon: Melinda Lima MD;  Location: [facility];  Service: Orthopedics    [place] HYSTEROSCOPY BX ENDOMETRIUM&/POLYPC W/WO D&C N/A 2018    Procedure: DILATATION AND CURETTAGE (D&C) WITH HYSTEROSCOPY;  Surgeon: [place] Martinez MD;  [facility] MAIN [facility];  Service: Gynecology    KY HYSTEROSCOPY ENDOMETRIAL ABLATION N/A 2018    Procedure: Dimitri Ovens;  Surgeon: [place] Martinez MD;  [facility]: [facility] MAIN [facility];  Service: Gynecology     Social History   Social History     Substance and Sexual Activity   Alcohol Use Yes    Comment: social, occasional     Social History     Substance and Sexual Activity   Drug Use No     Social History     Tobacco Use   Smoking Status Never   Smokeless Tobacco Never     Family History   Problem Relation Age of Onset    Hyperlipidemia Mother     Hypertension Mother     Breast cancer Mother     Hypertension Father     Heart disease Father     Breast cancer Maternal Grandmother     Ovarian cancer Paternal Grandmother        Meds/Allergies Current Outpatient Medications:     albuterol (PROVENTIL HFA,VENTOLIN HFA) 90 mcg/act inhaler    Ascorbic Acid (vitamin C) 100 MG tablet    doxycycline hyclate (VIBRA-TABS) 100 mg tablet    linaCLOtide (Linzess) 72 MCG CAPS    predniSONE 10 mg tablet    Allergies   Allergen Reactions    Other Hives     Shrimp/shellfish    Penicillin G Hives    Pepcid [Famotidine] Chest Pain    Shellfish Allergy - Food Allergy Hives and Edema           Objective     Blood pressure (!) 144/101, pulse 81, height 5' (1.524 m), weight 86.2 kg (190 lb). Body mass index is 37.11 kg/m². PHYSICAL EXAM:      General Appearance:   Alert, cooperative, no distress   HEENT:   Normocephalic, atraumatic, anicteric. Neck:  Supple, symmetrical, trachea midline   Lungs:   Clear to auscultation bilaterally; no rales, rhonchi or wheezing; respirations unlabored    Heart[de-identified]   Regular rate and rhythm; no murmur, rub, or gallop. Abdomen:   Soft, non-tender, non-distended; normal bowel sounds; no masses, no organomegaly    Genitalia:   Deferred    Rectal:   Deferred    Extremities:  No cyanosis, clubbing or edema    Pulses:  2+ and symmetric    Skin:  No jaundice, rashes, or lesions    Lymph nodes:  No palpable cervical lymphadenopathy        Lab Results:   No visits with results within 1 Day(s) from this visit.    Latest known visit with results is:   Admission on 11/22/2022, Discharged on 11/22/2022   Component Date Value    EXT Preg Test, Ur 11/22/2022 Negative     Control 11/22/2022 Valid     Case Report 11/22/2022                      Value:Surgical Pathology Report                         Case: [id number]                                   Authorizing Provider:  Stephen Brooks,  Collected:           11/22/2022 Ijeoma RASMUSSEN                                                                           Ordering Location:     [facility] Received:            11/22/2022 2035 Operating Room                                                               Pathologist:           Charles Diaz MD                                                       Specimen:    Soft Tissue, Other, SOFT TISSUE BIOPSY LEFT KNEE                                           Addendum 11/22/2022                      Value: This result contains rich text formatting which cannot be displayed here. Final Diagnosis 11/22/2022                      Value: This result contains rich text formatting which cannot be displayed here. Note 11/22/2022                      Value: This result contains rich text formatting which cannot be displayed here. Additional Information 11/22/2022                      Value: This result contains rich text formatting which cannot be displayed here. Gross Description 11/22/2022                      Value: This result contains rich text formatting which cannot be displayed here. Radiology Results:   XR RIBS LEFT W PA CHEST    Result Date: 9/30/2023  Narrative: History: Acute cough Study: Portable chest and 3 views left ribs Comparison: 8/30/2010    Impression: Findings/impression: CHEST: Heart size normal. Lungs are clear. No consolidation, effusion or pneumothorax. Left RIBS: No evidence of a left-sided rib fracture. No lytic or blastic lesions.  [id number]

## 2023-10-26 NOTE — PROGRESS NOTES
[facility] - Outpatient Consultation  Zeny Albrecht 37 y.o. female MRN: [id number]  Encounter: [id number]          ASSESSMENT AND PLAN:      1. Esophageal polyp  Additionally distant history of intestinal metaplasia of the antrum  - EGD; Future    2. Polyp of colon, unspecified part of colon, unspecified type  Last colonoscopy 8 years ago. - Colonoscopy; Future    3. Slow transit constipation    - Colonoscopy; Future  - linaCLOtide (Linzess) 72 MCG CAPS; Take 1 capsule by mouth daily before breakfast  Dispense: 30 capsule; Refill: 5    4. Family history of colonic polyps  Mother has history of polyps. 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 She presents to schedule EGD colonoscopy. Apparently has had polyps of the esophagus requires follow-up additionally family history of colon polyps she is now 37 she is here for scheduling colonoscopy. She does have some underlying constipation and desires a Linzess refill. Last colonoscopy 8 years ago she only has occasional constipation uses Linzess. Last upper endoscopy several years ago follow-up of esophageal polyp and has a distant history of intestinal metaplasia of the antrum. Mother has polyps otherwise family history noncontributory. REVIEW OF SYSTEMS:    CONSTITUTIONAL: Denies any fever, chills, rigors, and weight loss. HEENT: No earache or tinnitus. Denies hearing loss or visual disturbances. CARDIOVASCULAR: No chest pain or palpitations. RESPIRATORY: Denies any cough, hemoptysis, shortness of breath or dyspnea on exertion. GASTROINTESTINAL: As noted in the History of Present Illness. GENITOURINARY: No problems with urination. Denies any hematuria or dysuria. NEUROLOGIC: No dizziness or vertigo, denies headaches.    MUSCULOSKELETAL: Denies any muscle or joint pain. SKIN: Denies skin rashes or itching. ENDOCRINE: Denies excessive thirst. Denies intolerance to heat or cold. PSYCHOSOCIAL: Denies depression or anxiety. Denies any recent memory loss.        Historical Information   Past Medical History:   Diagnosis Date    Anemia     Arthritis     BARD1 gene mutation positive     Menorrhagia with regular cycle 2018    Added automatically from request for surgery [id number]     Past Surgical History:   Procedure Laterality Date    ABDOMINAL SURGERY      2 c-sections    ARTHROSCOPY KNEE Left 2016    Procedure: ARTHROSCOPY KNEE LATERAL aND MEDIAL MENISCECTOMY;  Surgeon: Shaquille Cooper MD;  Location: [address] [place] [address] MAIN OR;  Service:      SECTION      CHOLECYSTECTOMY      lap    KNEE ARTHROSCOPY Left     IL ARTHROSCOPY KNEE DIAGNOSTIC W/WO SYNOVIAL BX SPX Left 2022    Procedure: ARTHROSCOPY KNEE, LIMITED DEBRIDEMENT, BIOPSY, MEDIAL MENISCETOMY;  Surgeon: Sanket Wilson MD;  Location: [facility];  Service: Orthopedics    IL HYSTEROSCOPY BX ENDOMETRIUM&/POLYPC W/WO D&C N/A 2018    Procedure: DILATATION AND CURETTAGE (D&C) WITH HYSTEROSCOPY;  Surgeon: Tammy Lynne MD;  Location: [facility] MAIN OR;  Service: Gynecology    IL HYSTEROSCOPY ENDOMETRIAL ABLATION N/A 2018    Procedure: Britney Res;  Surgeon: Tammy Lynne MD;  Location: [facility] MAIN [facility];  Service: Gynecology     Social History   Social History     Substance and Sexual Activity   Alcohol Use Yes    Comment: social, occasional     Social History     Substance and Sexual Activity   Drug Use No     Social History     Tobacco Use   Smoking Status Never   Smokeless Tobacco Never     Family History   Problem Relation Age of Onset    Hyperlipidemia Mother     Hypertension Mother     Breast cancer Mother     Hypertension Father     Heart disease Father     Breast cancer Maternal Grandmother     Ovarian cancer Paternal Grandmother        Meds/Allergies Current Outpatient Medications:     albuterol (PROVENTIL HFA,VENTOLIN HFA) 90 mcg/act inhaler    Ascorbic Acid (vitamin C) 100 MG tablet    doxycycline hyclate (VIBRA-TABS) 100 mg tablet    linaCLOtide (Linzess) 72 MCG CAPS    predniSONE 10 mg tablet    Allergies   Allergen Reactions    Other Hives     Shrimp/shellfish    Penicillin G Hives    Pepcid [Famotidine] Chest Pain    Shellfish Allergy - Food Allergy Hives and Edema           Objective     Blood pressure (!) 144/101, pulse 81, height 5' (1.524 m), weight 86.2 kg (190 lb). Body mass index is 37.11 kg/m². PHYSICAL EXAM:      General Appearance:   Alert, cooperative, no distress   HEENT:   Normocephalic, atraumatic, anicteric. Neck:  Supple, symmetrical, trachea midline   Lungs:   Clear to auscultation bilaterally; no rales, rhonchi or wheezing; respirations unlabored    Heart[de-identified]   Regular rate and rhythm; no murmur, rub, or gallop. Abdomen:   Soft, non-tender, non-distended; normal bowel sounds; no masses, no organomegaly    Genitalia:   Deferred    Rectal:   Deferred    Extremities:  No cyanosis, clubbing or edema    Pulses:  2+ and symmetric    Skin:  No jaundice, rashes, or lesions    Lymph nodes:  No palpable cervical lymphadenopathy        Lab Results:   No visits with results within 1 Day(s) from this visit.    Latest known visit with results is:   Admission on 11/22/2022, Discharged on 11/22/2022   Component Date Value    EXT Preg Test, Ur 11/22/2022 Negative     Control 11/22/2022 Valid     Case Report 11/22/2022                      Value:Surgical Pathology Report                         Case: [id number]                                   Authorizing Provider:  [facility],  Collected:           11/22/2022 Ijeoma RASMUSSEN                                                                           Ordering Location:     [address] Received:            11/22/2022 2030 Operating Room                                                               Pathologist:           Nely Koch MD                                                       Specimen:    Soft Tissue, Other, SOFT TISSUE BIOPSY LEFT KNEE                                           Addendum 11/22/2022                      Value: This result contains rich text formatting which cannot be displayed here. Final Diagnosis 11/22/2022                      Value: This result contains rich text formatting which cannot be displayed here. Note 11/22/2022                      Value: This result contains rich text formatting which cannot be displayed here. Additional Information 11/22/2022                      Value: This result contains rich text formatting which cannot be displayed here. Gross Description 11/22/2022                      Value: This result contains rich text formatting which cannot be displayed here. Radiology Results:   XR RIBS LEFT W PA CHEST    Result Date: 9/30/2023  Narrative: History: Acute cough Study: Portable chest and 3 views left ribs Comparison: 8/30/2010    Impression: Findings/impression: CHEST: Heart size normal. Lungs are clear. No consolidation, effusion or pneumothorax. Left RIBS: No evidence of a left-sided rib fracture. No lytic or blastic lesions.  [id number]

## 2023-10-26 NOTE — PATIENT INSTRUCTIONS
Scheduled date of EGD/colonoscopy (as of today):11/21/23  Physician performing EGD/colonoscopy:Kenneth  Location of EGD/colonoscopy:[facility]  Desired bowel prep reviewed with patient:Miralax/dulculax  Instructions reviewed with patient by:Magalis SAUER  Clearances:  None

## 2023-11-09 ENCOUNTER — TELEPHONE (OUTPATIENT)
Dept: OBGYN CLINIC | Facility: CLINIC | Age: 43
End: 2023-11-09

## 2023-11-09 NOTE — TELEPHONE ENCOUNTER
Pt lmom- had a mammo done yesterday that was abnormal for the right breast. Was wondering what the next steps were?

## 2023-11-09 NOTE — TELEPHONE ENCOUNTER
Mammogram was completed at [facility] - will route to provider for review. Last APE 7/22. No future appointment.

## 2023-11-12 NOTE — TELEPHONE ENCOUNTER
As noted on mammo report-they are advising additional imaging with right diagnostic mammo and US. Could also consider ABUS for supplemental screening given Tyrer-Cuzik score >20% lifetime risk. Typically when done through [facility] we do not need to send orders for breast imaging.

## 2023-11-17 ENCOUNTER — TELEPHONE (OUTPATIENT)
Age: 43
End: 2023-11-17

## 2023-11-17 NOTE — TELEPHONE ENCOUNTER
Patients GI provider:  Dr. Pee Sesay    Number to return call: [phone]    Reason for call: Pt calling to advised her insurance has denied the EGD and need more supporting notes for medicine necessity.     Scheduled procedure/appointment date if applicable:  procedure scheduled 11/21/2023

## 2023-11-20 RX ORDER — SODIUM CHLORIDE, SODIUM LACTATE, POTASSIUM CHLORIDE, CALCIUM CHLORIDE 600; 310; 30; 20 MG/100ML; MG/100ML; MG/100ML; MG/100ML
125 INJECTION, SOLUTION INTRAVENOUS CONTINUOUS
Status: CANCELLED | OUTPATIENT
Start: 2023-11-20

## 2023-11-20 RX ORDER — LIDOCAINE HYDROCHLORIDE 10 MG/ML
0.5 INJECTION, SOLUTION EPIDURAL; INFILTRATION; INTRACAUDAL; PERINEURAL ONCE AS NEEDED
Status: CANCELLED | OUTPATIENT
Start: 2023-11-20

## 2023-11-21 ENCOUNTER — ANESTHESIA EVENT (OUTPATIENT)
Dept: GASTROENTEROLOGY | Facility: AMBULATORY SURGERY CENTER | Age: 43
End: 2023-11-21

## 2023-11-21 ENCOUNTER — ANESTHESIA (OUTPATIENT)
Dept: GASTROENTEROLOGY | Facility: AMBULATORY SURGERY CENTER | Age: 43
End: 2023-11-21

## 2023-11-21 ENCOUNTER — HOSPITAL ENCOUNTER (OUTPATIENT)
Dept: GASTROENTEROLOGY | Facility: AMBULATORY SURGERY CENTER | Age: 43
Discharge: HOME | End: 2023-11-21

## 2023-11-21 VITALS
SYSTOLIC BLOOD PRESSURE: 147 MMHG | DIASTOLIC BLOOD PRESSURE: 77 MMHG | RESPIRATION RATE: 18 BRPM | WEIGHT: 189 LBS | OXYGEN SATURATION: 99 % | HEART RATE: 79 BPM | TEMPERATURE: 97.4 F | BODY MASS INDEX: 37.11 KG/M2 | HEIGHT: 60 IN

## 2023-11-21 DIAGNOSIS — K63.5 POLYP OF COLON, UNSPECIFIED PART OF COLON, UNSPECIFIED TYPE: ICD-10-CM

## 2023-11-21 DIAGNOSIS — Z83.719 FAMILY HISTORY OF COLONIC POLYPS: ICD-10-CM

## 2023-11-21 DIAGNOSIS — K21.00 GASTROESOPHAGEAL REFLUX DISEASE WITH ESOPHAGITIS WITHOUT HEMORRHAGE: Primary | ICD-10-CM

## 2023-11-21 DIAGNOSIS — K22.81 ESOPHAGEAL POLYP: ICD-10-CM

## 2023-11-21 DIAGNOSIS — K59.01 SLOW TRANSIT CONSTIPATION: ICD-10-CM

## 2023-11-21 LAB
EXT PREGNANCY TEST URINE: NEGATIVE
EXT. CONTROL: NORMAL

## 2023-11-21 PROCEDURE — 88305 TISSUE EXAM BY PATHOLOGIST: CPT | Performed by: PATHOLOGY

## 2023-11-21 RX ORDER — LIDOCAINE HYDROCHLORIDE 10 MG/ML
0.5 INJECTION, SOLUTION EPIDURAL; INFILTRATION; INTRACAUDAL; PERINEURAL ONCE AS NEEDED
Status: COMPLETED | OUTPATIENT
Start: 2023-11-21 | End: 2023-11-21

## 2023-11-21 RX ORDER — SODIUM CHLORIDE, SODIUM LACTATE, POTASSIUM CHLORIDE, CALCIUM CHLORIDE 600; 310; 30; 20 MG/100ML; MG/100ML; MG/100ML; MG/100ML
125 INJECTION, SOLUTION INTRAVENOUS CONTINUOUS
Status: DISCONTINUED | OUTPATIENT
Start: 2023-11-21 | End: 2023-11-25 | Stop reason: HOSPADM

## 2023-11-21 RX ORDER — OMEPRAZOLE 20 MG/1
20 CAPSULE, DELAYED RELEASE ORAL DAILY
Qty: 60 CAPSULE | Refills: 2 | Status: SHIPPED | OUTPATIENT
Start: 2023-11-21

## 2023-11-21 RX ORDER — ONDANSETRON 2 MG/ML
INJECTION INTRAMUSCULAR; INTRAVENOUS AS NEEDED
Status: DISCONTINUED | OUTPATIENT
Start: 2023-11-21 | End: 2023-11-21

## 2023-11-21 RX ORDER — PROPOFOL 10 MG/ML
INJECTION, EMULSION INTRAVENOUS AS NEEDED
Status: DISCONTINUED | OUTPATIENT
Start: 2023-11-21 | End: 2023-11-21

## 2023-11-21 RX ADMIN — PROPOFOL 30 MG: 10 INJECTION, EMULSION INTRAVENOUS at 13:23

## 2023-11-21 RX ADMIN — LIDOCAINE HYDROCHLORIDE 5 ML: 10 INJECTION, SOLUTION EPIDURAL; INFILTRATION; INTRACAUDAL; PERINEURAL at 13:05

## 2023-11-21 RX ADMIN — PROPOFOL 20 MG: 10 INJECTION, EMULSION INTRAVENOUS at 13:28

## 2023-11-21 RX ADMIN — PROPOFOL 30 MG: 10 INJECTION, EMULSION INTRAVENOUS at 13:16

## 2023-11-21 RX ADMIN — SODIUM CHLORIDE, SODIUM LACTATE, POTASSIUM CHLORIDE, CALCIUM CHLORIDE: 600; 310; 30; 20 INJECTION, SOLUTION INTRAVENOUS at 12:45

## 2023-11-21 RX ADMIN — PROPOFOL 50 MG: 10 INJECTION, EMULSION INTRAVENOUS at 13:08

## 2023-11-21 RX ADMIN — PROPOFOL 50 MG: 10 INJECTION, EMULSION INTRAVENOUS at 13:10

## 2023-11-21 RX ADMIN — PROPOFOL 20 MG: 10 INJECTION, EMULSION INTRAVENOUS at 13:24

## 2023-11-21 RX ADMIN — PROPOFOL 70 MG: 10 INJECTION, EMULSION INTRAVENOUS at 13:12

## 2023-11-21 RX ADMIN — ONDANSETRON 4 MG: 2 INJECTION INTRAMUSCULAR; INTRAVENOUS at 13:09

## 2023-11-21 RX ADMIN — PROPOFOL 30 MG: 10 INJECTION, EMULSION INTRAVENOUS at 13:14

## 2023-11-21 RX ADMIN — PROPOFOL 20 MG: 10 INJECTION, EMULSION INTRAVENOUS at 13:19

## 2023-11-21 RX ADMIN — PROPOFOL 100 MG: 10 INJECTION, EMULSION INTRAVENOUS at 13:06

## 2023-11-21 RX ADMIN — PROPOFOL 100 MG: 10 INJECTION, EMULSION INTRAVENOUS at 13:05

## 2023-11-21 NOTE — INTERVAL H&P NOTE
H&P reviewed. After examining the patient I find no changes in the patients condition since the H&P had been written.     Vitals:    11/21/23 1242   BP: 155/67   Pulse: 99   Resp: 18   Temp: (!) 97.4 °F (36.3 °C)   SpO2: 99%

## 2023-11-21 NOTE — ANESTHESIA PROCEDURE NOTES
Anesthesia Notable Event    Date/Time: 11/21/2023 3:30 PM    Performed by: Joseph Armstrong MD  Authorized by: Joseph Armstrong MD

## 2023-11-21 NOTE — ANESTHESIA POSTPROCEDURE EVALUATION
Post-Op Assessment Note    CV Status:  Stable    Pain management: adequate       Mental Status:  Sleepy   Hydration Status:  Euvolemic   PONV Controlled:  Controlled   Airway Patency:  Patent     Post Op Vitals Reviewed: Yes    No anethesia notable event occurred.     Staff: CRNA               BP   124/62   Temp  98.4   Pulse  77   Resp   18   SpO2   99

## 2023-11-21 NOTE — ANESTHESIA PREPROCEDURE EVALUATION
Procedure:  EGD  COLONOSCOPY    Relevant Problems   ANESTHESIA   (+) PONV (postoperative nausea and vomiting)      HEMATOLOGY   (+) Iron deficiency anemia due to chronic blood loss      MUSCULOSKELETAL   (+) Arthritis        Physical Exam    Airway    Mallampati score: III  TM Distance: >3 FB  Neck ROM: full     Dental   No notable dental hx     Cardiovascular  Rhythm: regular, Rate: normal, Cardiovascular exam normal    Pulmonary  Pulmonary exam normal Breath sounds clear to auscultation    Other Findings  post-pubertal.      Anesthesia Plan  ASA Score- 2     Anesthesia Type- IV sedation with anesthesia with ASA Monitors. Additional Monitors:     Airway Plan:     Comment: Discussed risks/benefits, including medication reactions, awareness, aspiration, and serious/life threatening complications. Plan to maintain native airway with IVGA, monitored with EtCO2. Plan Factors-Exercise tolerance (METS): >4 METS. Chart reviewed. Patient summary reviewed. Patient instructed to abstain from smoking on day of procedure. Patient did not smoke on day of surgery. Induction- intravenous. Postoperative Plan-     Informed Consent- Anesthetic plan and risks discussed with patient. I personally reviewed this patient with the CRNA. Discussed and agreed on the Anesthesia Plan with the CRNA. Hawa Mitchell

## 2023-11-28 PROCEDURE — 88305 TISSUE EXAM BY PATHOLOGIST: CPT | Performed by: PATHOLOGY

## 2023-11-30 NOTE — RESULT ENCOUNTER NOTE
Patient was informed via my chart biopsies were benign. Repeat EGD 5 years due to history of gastric polyps and reflux. Repeat colonoscopy 5 years due to history of tubular adenomas.

## 2024-03-15 DIAGNOSIS — K21.00 GASTROESOPHAGEAL REFLUX DISEASE WITH ESOPHAGITIS WITHOUT HEMORRHAGE: ICD-10-CM

## 2024-03-15 RX ORDER — OMEPRAZOLE 20 MG/1
20 CAPSULE, DELAYED RELEASE ORAL DAILY
Qty: 90 CAPSULE | Refills: 1 | Status: SHIPPED | OUTPATIENT
Start: 2024-03-15

## 2024-03-26 NOTE — PROGRESS NOTES
Assessment/Plan   Problem List Items Addressed This Visit       Well woman exam - Primary     Other Visit Diagnoses       Menorrhagia with regular cycle        Relevant Orders    US pelvis complete w transvaginal            Discussion    All questions have been answered to her satisfaction  RTO for APE or sooner if needed  Pap deferred.   Breast imaging f/u planned. She does MRI yearly as well due to BARD 1 gene mutation +      Subjective     HPI   Cindy Pulido is a 43 y.o. female who presents for annual well woman exam.     LMP - 3/6/24 ; Periods are reg q 28 days and last 3-4 days; No excessive bleeding; No intermenstrual bleeding or spotting; Cramps are tolerable.  Will have one to two days of heavier bleeding then tapers off.   S/p ablation, gets monthly period since ablation.     No vulvar itch/burn; No vaginal itch/burn; No abn discharge or odor; No urinary sx - burning/pain/frequency/hematuria    No concerning breast masses, asymmetry, nipple discharge or bleeding, changes in skin of breast, or breast tenderness bilaterally    No abd/pelvic pain or HAs;     No menopausal symptoms.    Pt is sexually active in a mutually monog/ sexual relationship; No issues with intercourse; She declines sti/hiv/hep testing; Feels safe at home      (+) PCP for routine Bw/care;    Last Pap : 7/19/22 WNL neg HPV   History of abnormal Pap smear: denies   Mammo:11/8/23 right breast BIRADs 0-had US BIRADS 3, due for follow up 6 mths. TC score of 21.06-could consider yearly ABUS  Colonoscopy:11/23 f/u 5 years    Review of Systems   Constitutional: Negative.    Respiratory: Negative.     Gastrointestinal: Negative.    Endocrine: Negative.    Genitourinary: Negative.        The following portions of the patient's history were reviewed and updated as appropriate: allergies, current medications, past family history, past medical history, past social history, past surgical history, and problem list.         OB History           2    Para   2    Term   2            AB        Living   2         SAB        IAB        Ectopic        Multiple        Live Births   2                 Past Medical History:   Diagnosis Date    Anemia     Arthritis     BARD1 gene mutation positive     Colon polyp     Gastric polyps     Hx of bronchitis     2023    Menorrhagia with regular cycle 2018    Added automatically from request for surgery [id number]    Otitis media        Past Surgical History:   Procedure Laterality Date    ABDOMINAL SURGERY      2 c-sections    ARTHROSCOPY KNEE Left 2016    Procedure: ARTHROSCOPY KNEE LATERAL aND MEDIAL MENISCECTOMY;  Surgeon: Rebel Felipe MD;  Location: [facility] MAIN OR;  Service:      SECTION      CHOLECYSTECTOMY      lap    COLONOSCOPY      KNEE ARTHROSCOPY Left     [place] ARTHROSCOPY KNEE DIAGNOSTIC W/WO SYNOVIAL BX SPX Left 2022    Procedure: ARTHROSCOPY KNEE, LIMITED DEBRIDEMENT, BIOPSY, MEDIAL MENISCETOMY;  Surgeon: Rebel Fleipe MD;  Location: [facility] MAIN OR;  Service: Orthopedics    [place] HYSTEROSCOPY BX ENDOMETRIUM&/POLYPC W/WO D&C N/A 2018    Procedure: DILATATION AND CURETTAGE (D&C) WITH HYSTEROSCOPY;  Surgeon: Marylin Salinas MD;  Location:  MAIN OR;  Service: Gynecology    WV HYSTEROSCOPY ENDOMETRIAL ABLATION N/A 2018    Procedure: ABLATION ENDOMETRIAL ENEDELIA;  Surgeon: Marylin Salinas MD;  Location: [facility]IN [facility];  Service: Gynecology    UPPER GASTROINTESTINAL ENDOSCOPY         Family History   Problem Relation Age of Onset    Hyperlipidemia Mother     Hypertension Mother     Breast cancer Mother     Hypertension Father     Heart disease Father     Breast cancer Maternal Grandmother     Ovarian cancer Paternal Grandmother        Social History     Socioeconomic History    Marital status: /Civil Union     Spouse name: Not on file    Number of children: Not on file    Years of education: Not on file    Highest education level: Not on file    Occupational History    Not on file   Tobacco Use    Smoking status: Never    Smokeless tobacco: Never   Vaping Use    Vaping status: Never Used   Substance and Sexual Activity    Alcohol use: Not Currently     Comment: social, occasional    Drug use: No    Sexual activity: Yes     Partners: Male     Birth control/protection: None   Other Topics Concern    Not on file   Social History Narrative    Not on file     Social Determinants of Health     Financial Resource Strain: Not on file   Food Insecurity: Not on file   Transportation Needs: Not on file   Physical Activity: Not on file   Stress: Not on file   Social Connections: Not on file   Intimate Partner Violence: Not on file   Housing Stability: Not on file         Current Outpatient Medications:     albuterol (PROVENTIL HFA,VENTOLIN HFA) 90 mcg/act inhaler, INHALE 2 PUFFS EVERY 4 (FOUR) HOURS AS NEEDED FOR WHEEZING OR COUGH. (Patient not taking: Reported on 3/28/2024), Disp: , Rfl:     Ascorbic Acid (vitamin C) 100 MG tablet, Take 100 mg by mouth daily (Patient not taking: Reported on 3/28/2024), Disp: , Rfl:     linaCLOtide (Linzess) 72 MCG CAPS, Take 1 capsule by mouth daily before breakfast (Patient not taking: Reported on 3/28/2024), Disp: 30 capsule, Rfl: 5    omeprazole (PriLOSEC) 20 mg delayed release capsule, TAKE 1 CAPSULE (20 MG TOTAL) BY MOUTH DAILY AS NEEDED FOR REFLUX. (Patient not taking: Reported on 3/28/2024), Disp: 90 capsule, Rfl: 1    predniSONE 10 mg tablet, Take 4 tabs po daily x 2 days, 3 tabs po daily x 2 days, 2 tabs po daily x 2 days, 1 tab po daily x 2 days (Patient not taking: Reported on 11/2/2023), Disp: , Rfl:     Allergies   Allergen Reactions    Other Hives     Shrimp/shellfish    Penicillin G Hives    Pepcid [Famotidine] Chest Pain    Shellfish Allergy - Food Allergy Hives and Edema       Objective   Vitals:    03/28/24 0723   BP: 124/76   BP Location: Left arm   Patient Position: Sitting   Cuff Size: Standard   Weight: 86.2 kg  (190 lb)   Height: 5' (1.524 m)     Physical Exam  Vitals reviewed.   HENT:      Head: Normocephalic and atraumatic.   Cardiovascular:      Rate and Rhythm: Normal rate and regular rhythm.   Pulmonary:      Effort: Pulmonary effort is normal.      Breath sounds: Normal breath sounds.   Chest:   Breasts:     Breasts are symmetrical.      Right: No swelling, bleeding, inverted nipple, mass, nipple discharge, skin change or tenderness.      Left: No swelling, bleeding, inverted nipple, mass, nipple discharge, skin change or tenderness.   Abdominal:      General: Abdomen is flat. Bowel sounds are normal.      Palpations: Abdomen is soft.      Tenderness: There is no abdominal tenderness. There is no right CVA tenderness, left CVA tenderness or guarding.   Genitourinary:     General: Normal vulva.      Pubic Area: No rash.       Labia:         Right: No rash, tenderness, lesion or injury.         Left: No rash, tenderness, lesion or injury.       Urethra: No prolapse, urethral pain, urethral swelling or urethral lesion.      Vagina: Normal. No signs of injury and foreign body. No vaginal discharge or erythema.      Cervix: Normal.      Uterus: Normal.       Adnexa: Right adnexa normal and left adnexa normal.   Musculoskeletal:      Cervical back: Neck supple.   Lymphadenopathy:      Upper Body:      Right upper body: No axillary adenopathy.      Left upper body: No axillary adenopathy.   Skin:     General: Skin is warm and dry.   Neurological:      Mental Status: She is alert and oriented to person, place, and time.   Psychiatric:         Mood and Affect: Mood normal.         Behavior: Behavior normal.         Thought Content: Thought content normal.         Judgment: Judgment normal.         There are no Patient Instructions on file for this visit.

## 2024-03-28 ENCOUNTER — ANNUAL EXAM (OUTPATIENT)
Dept: OBGYN CLINIC | Facility: CLINIC | Age: 44
End: 2024-03-28
Payer: COMMERCIAL

## 2024-03-28 VITALS
HEIGHT: 60 IN | BODY MASS INDEX: 37.3 KG/M2 | SYSTOLIC BLOOD PRESSURE: 124 MMHG | DIASTOLIC BLOOD PRESSURE: 76 MMHG | WEIGHT: 190 LBS

## 2024-03-28 DIAGNOSIS — Z01.419 WELL WOMAN EXAM: Primary | ICD-10-CM

## 2024-03-28 DIAGNOSIS — N92.0 MENORRHAGIA WITH REGULAR CYCLE: ICD-10-CM

## 2024-03-28 PROCEDURE — 99396 PREV VISIT EST AGE 40-64: CPT | Performed by: PHYSICIAN ASSISTANT

## 2024-07-08 ENCOUNTER — TELEPHONE (OUTPATIENT)
Age: 44
End: 2024-07-08

## 2024-07-09 DIAGNOSIS — Z80.3 FAMILY HISTORY OF BREAST CANCER: ICD-10-CM

## 2024-07-09 DIAGNOSIS — R92.30 DENSE BREAST: Primary | ICD-10-CM

## 2024-07-09 DIAGNOSIS — Z15.01 BARD1 GENE MUTATION POSITIVE: ICD-10-CM

## 2024-07-12 NOTE — TELEPHONE ENCOUNTER
Left message for patient that prior authorization was approved per [facility].  Order needs to be faxed to them please  
Order faxed with auth info to [facility]. ANNIE CMA   
Patient called, advised [facility] denied authorization for the breast MRI due to insufficient info. Pt states it needs to add + for BRCA gene & strong family history. Pt also states last mammogram notes dense R breast. Please call pt once this has been updated.   
Pt calling back requesting her prior auth confirmation to be sent to her radiology facility. Faxing number is [phone] ATTN [facility] Rees   
Sent back to [facility] clerical . Cts's are unable to fax .   
Include Location In Plan?: No
Detail Level: Generalized

## 2025-05-05 NOTE — PROGRESS NOTES
Assessment & Plan   Problem List Items Addressed This Visit     Well woman exam - Primary   Other Visit Diagnoses       Menorrhagia with regular cycle        Relevant Orders    CBC and differential    Iron Panel (Includes Ferritin, Iron Sat%, Iron, and TIBC)    TSH, 3rd generation with Free T4 reflex    US pelvis complete w transvaginal      Other fatigue        Relevant Orders    TSH, 3rd generation with Free T4 reflex      Encounter for screening for malignant neoplasm of breast, unspecified screening modality        Relevant Orders    US breast right limited (diagnostic)    Mammo screening bilateral w 3d and cad      Family history of ovarian cancer        Relevant Orders    US pelvis complete w transvaginal          Discussion    All questions have been answered to her satisfaction  RTO for APE or sooner if needed  Pap deferred, UTD per ASCCP guidelines  Blood work ordered to evaluate for anemia and thyroid dysfunction given recent menorrhagia and fatigue.  Pelvic ultrasound ordered.  Breast imaging ordered.    Subjective     HPI   Cindy Pulido is a 44 y.o. female who presents for annual well woman exam.     LMP - 05/01/2025 ; Periods have been heavier more recently. Still tolerable but heavier than since ablation. She also endorses recent fatigue and desires to have labs checked to evaluate for anemia. Comes q 30 days. Had ablation fall 2018.     No vulvar itch/burn; No vaginal itch/burn; No abn discharge or odor; No urinary sx - burning/pain/frequency/hematuria    No concerning breast masses, asymmetry, nipple discharge or bleeding, changes in skin of breast, or breast tenderness bilaterally    No abd/pelvic pain or HAs;     No menopausal symptoms.    She has been working on weight loss with Zepbound and increased exercise.     Pt is sexually active in a mutually monog/ sexual relationship; No issues with intercourse; She declines sti/hiv/hep testing; Feels safe at home  Current contraception:  withdrawal/NFP    (+) PCP for routine Bw/care;    Last Pap : 2022, WNL, neg HPV  History of abnormal Pap smear: denies   Mammo: 2024 BIRADS 3, right U/S stable cyst -- imaging ordered for this year  Colonoscopy: 2023, f/u 5 years    Review of Systems   Constitutional: Negative.    Respiratory: Negative.     Cardiovascular: Negative.    Gastrointestinal: Negative.    Genitourinary: Negative.    Skin: Negative.    Psychiatric/Behavioral: Negative.         The following portions of the patient's history were reviewed and updated as appropriate: allergies, current medications, past family history, past medical history, past social history, past surgical history, and problem list.         OB History        2    Para   2    Term   2            AB        Living   2       SAB        IAB        Ectopic        Multiple        Live Births   2                 Past Medical History:   Diagnosis Date   • Anemia    • Arthritis    • BARD1 gene mutation positive    • Colon polyp    • Gastric polyps    • Hx of bronchitis     2023   • Menorrhagia with regular cycle 2018    Added automatically from request for surgery [id number]   • Otitis media        Past Surgical History:   Procedure Laterality Date   • ABDOMINAL SURGERY      2 c-sections   • ARTHROSCOPY KNEE Left 2016    Procedure: ARTHROSCOPY KNEE LATERAL aND MEDIAL MENISCECTOMY;  Surgeon: Rebel Felipe MD;  Location: [facility] MAIN OR;  Service:    •  SECTION     • CHOLECYSTECTOMY      lap   • COLONOSCOPY     • KNEE ARTHROSCOPY Left    • [place] ARTHROSCOPY KNEE DIAGNOSTIC W/WO SYNOVIAL BX SPX Left 2022    Procedure: ARTHROSCOPY KNEE, LIMITED DEBRIDEMENT, BIOPSY, MEDIAL MENISCETOMY;  Surgeon: Rebel Felipe MD;  Location: [facility] MAIN OR;  Service: Orthopedics   • [place] HYSTEROSCOPY BX ENDOMETRIUM&/POLYPC W/WO D&C N/A 2018    Procedure: DILATATION AND CURETTAGE (D&C) WITH HYSTEROSCOPY;  Surgeon: Marylin Salinas MD;   Location: [facility];  Service: Gynecology   • [place] HYSTEROSCOPY ENDOMETRIAL ABLATION N/A 11/29/2018    Procedure: ABLATION ENDOMETRIAL ENEDELIA;  Surgeon: Marylin Salinas MD;  Location: [facility];  Service: Gynecology   • UPPER GASTROINTESTINAL ENDOSCOPY         Family History   Problem Relation Age of Onset   • Hyperlipidemia Mother    • Hypertension Mother    • Breast cancer Mother    • Hypertension Father    • Heart disease Father    • Breast cancer Maternal Grandmother    • Ovarian cancer Paternal Grandmother        Social History     Socioeconomic History   • Marital status: /Civil Union     Spouse name: Not on file   • Number of children: Not on file   • Years of education: Not on file   • Highest education level: Not on file   Occupational History   • Not on file   Tobacco Use   • Smoking status: Never   • Smokeless tobacco: Never   Vaping Use   • Vaping status: Never Used   Substance and Sexual Activity   • Alcohol use: Not Currently   • Drug use: No   • Sexual activity: Yes     Partners: Male     Birth control/protection: None   Other Topics Concern   • Not on file   Social History Narrative   • Not on file     Social Drivers of Health     Financial Resource Strain: Not on file   Food Insecurity: Not on file   Transportation Needs: Not on file   Physical Activity: Not on file   Stress: Not on file   Social Connections: Not on file   Intimate Partner Violence: Not on file   Housing Stability: Not on file         Current Outpatient Medications:   •  rosuvastatin (CRESTOR) 5 mg tablet, Take 1 tablet by mouth in the morning, Disp: , Rfl:   •  Zepbound 10 MG/0.5ML auto-injector, 0.5 ML SUBCUTANEOUS WEEKLY Z68.36 30 DAYS, Disp: , Rfl:   •  linaCLOtide (Linzess) 72 MCG CAPS, Take 1 capsule by mouth daily before breakfast (Patient not taking: Reported on 5/6/2025), Disp: 30 capsule, Rfl: 5  •  omeprazole (PriLOSEC) 20 mg delayed release capsule, TAKE 1 CAPSULE (20 MG TOTAL) BY MOUTH DAILY AS NEEDED FOR  REFLUX. (Patient not taking: Reported on 5/6/2025), Disp: 90 capsule, Rfl: 1    Allergies   Allergen Reactions   • Other Hives     Shrimp/shellfish   • Penicillin G Hives   • Pepcid [Famotidine] Chest Pain   • Shellfish Allergy - Food Allergy Hives and Edema       Objective   Vitals:    05/06/25 1044   BP: 114/76   BP Location: Left arm   Patient Position: Sitting   Cuff Size: Standard   Weight: 80.7 kg (178 lb)     Physical Exam  Vitals reviewed.   Constitutional:       Appearance: Normal appearance.   HENT:      Head: Normocephalic and atraumatic.   Cardiovascular:      Rate and Rhythm: Normal rate and regular rhythm.   Pulmonary:      Effort: Pulmonary effort is normal.      Breath sounds: Normal breath sounds.   Chest:   Breasts:     Right: Normal. No swelling, bleeding, inverted nipple, mass, nipple discharge, skin change or tenderness.      Left: Normal. No swelling, bleeding, inverted nipple, mass, nipple discharge, skin change or tenderness.   Abdominal:      General: Abdomen is flat. Bowel sounds are normal.      Palpations: Abdomen is soft.   Genitourinary:     General: Normal vulva.      Labia:         Right: No rash, tenderness, lesion or injury.         Left: No rash, tenderness, lesion or injury.       Urethra: No prolapse, urethral pain, urethral swelling or urethral lesion.      Vagina: Normal.      Cervix: Normal.      Uterus: Normal.       Adnexa: Right adnexa normal and left adnexa normal.   Skin:     General: Skin is warm and dry.   Neurological:      Mental Status: She is alert.   Psychiatric:         Mood and Affect: Mood normal.         Behavior: Behavior normal.         Thought Content: Thought content normal.         Judgment: Judgment normal.         There are no Patient Instructions on file for this visit.

## 2025-05-06 ENCOUNTER — ANNUAL EXAM (OUTPATIENT)
Dept: OBGYN CLINIC | Facility: CLINIC | Age: 45
End: 2025-05-06
Payer: COMMERCIAL

## 2025-05-06 VITALS — WEIGHT: 178 LBS | SYSTOLIC BLOOD PRESSURE: 114 MMHG | DIASTOLIC BLOOD PRESSURE: 76 MMHG | BODY MASS INDEX: 34.76 KG/M2

## 2025-05-06 DIAGNOSIS — R53.83 OTHER FATIGUE: ICD-10-CM

## 2025-05-06 DIAGNOSIS — Z12.39 ENCOUNTER FOR SCREENING FOR MALIGNANT NEOPLASM OF BREAST, UNSPECIFIED SCREENING MODALITY: ICD-10-CM

## 2025-05-06 DIAGNOSIS — Z80.41 FAMILY HISTORY OF OVARIAN CANCER: ICD-10-CM

## 2025-05-06 DIAGNOSIS — Z01.419 WELL WOMAN EXAM: Primary | ICD-10-CM

## 2025-05-06 DIAGNOSIS — N92.0 MENORRHAGIA WITH REGULAR CYCLE: ICD-10-CM

## 2025-05-06 PROCEDURE — 99396 PREV VISIT EST AGE 40-64: CPT | Performed by: PHYSICIAN ASSISTANT

## 2025-05-06 RX ORDER — TIRZEPATIDE 10 MG/.5ML
INJECTION, SOLUTION SUBCUTANEOUS
COMMUNITY
Start: 2025-01-30

## 2025-05-06 RX ORDER — ROSUVASTATIN CALCIUM 5 MG/1
1 TABLET, COATED ORAL DAILY
COMMUNITY
Start: 2025-04-26

## 2025-05-23 ENCOUNTER — RESULTS FOLLOW-UP (OUTPATIENT)
Dept: OBGYN CLINIC | Facility: CLINIC | Age: 45
End: 2025-05-23

## 2025-05-23 LAB
BASOPHILS # BLD AUTO: 27 CELLS/UL (ref 0–200)
BASOPHILS NFR BLD AUTO: 0.3 %
EOSINOPHIL # BLD AUTO: 180 CELLS/UL (ref 15–500)
EOSINOPHIL NFR BLD AUTO: 2 %
ERYTHROCYTE [DISTWIDTH] IN BLOOD BY AUTOMATED COUNT: 11.9 % (ref 11–15)
FERRITIN SERPL-MCNC: 42 NG/ML (ref 16–232)
HCT VFR BLD AUTO: 41.8 % (ref 35–45)
HGB BLD-MCNC: 14 G/DL (ref 11.7–15.5)
IRON SATN MFR SERPL: 19 % (CALC) (ref 16–45)
IRON SERPL-MCNC: 61 MCG/DL (ref 40–190)
LYMPHOCYTES # BLD AUTO: 1647 CELLS/UL (ref 850–3900)
LYMPHOCYTES NFR BLD AUTO: 18.3 %
MCH RBC QN AUTO: 31.3 PG (ref 27–33)
MCHC RBC AUTO-ENTMCNC: 33.5 G/DL (ref 32–36)
MCV RBC AUTO: 93.3 FL (ref 80–100)
MONOCYTES # BLD AUTO: 495 CELLS/UL (ref 200–950)
MONOCYTES NFR BLD AUTO: 5.5 %
NEUTROPHILS # BLD AUTO: 6651 CELLS/UL (ref 1500–7800)
NEUTROPHILS NFR BLD AUTO: 73.9 %
PLATELET # BLD AUTO: 303 THOUSAND/UL (ref 140–400)
PMV BLD REES-ECKER: 10.4 FL (ref 7.5–12.5)
RBC # BLD AUTO: 4.48 MILLION/UL (ref 3.8–5.1)
TIBC SERPL-MCNC: 326 MCG/DL (CALC) (ref 250–450)
TSH SERPL-ACNC: 1.58 MIU/L
WBC # BLD AUTO: 9 THOUSAND/UL (ref 3.8–10.8)

## 2025-05-26 ENCOUNTER — RESULTS FOLLOW-UP (OUTPATIENT)
Dept: OBGYN CLINIC | Facility: CLINIC | Age: 45
End: 2025-05-26

## 2025-06-10 ENCOUNTER — RESULTS FOLLOW-UP (OUTPATIENT)
Age: 45
End: 2025-06-10

## (undated) DEVICE — PROBE ABLATION  APOLLO RF 50 DEG MULTI PORT

## (undated) DEVICE — FABRIC REINFORCED, SURGICAL GOWN, XL: Brand: CONVERTORS

## (undated) DEVICE — OCCLUSIVE GAUZE STRIP,3% BISMUTH TRIBROMOPHENATE IN PETROLATUM BLEND: Brand: XEROFORM

## (undated) DEVICE — GLOVE INDICATOR PI UNDERGLOVE SZ 6.5 BLUE

## (undated) DEVICE — CHLORAPREP HI-LITE 26ML ORANGE

## (undated) DEVICE — GLOVE INDICATOR PI UNDERGLOVE SZ 8 BLUE

## (undated) DEVICE — GAUZE SPONGES,16 PLY: Brand: CURITY

## (undated) DEVICE — MAYO STAND COVER: Brand: CONVERTORS

## (undated) DEVICE — GLOVE INDICATOR PI UNDERGLOVE SZ 7 BLUE

## (undated) DEVICE — ACE WRAP 6 IN STERILE

## (undated) DEVICE — GLOVE SRG BIOGEL 6.5

## (undated) DEVICE — STERILE MINERVA DISPOSABLE HANDPIECECONTENTS:(1) ONE SINGLE USE STERILE MINERVA ES DISPOSABLE HANDPIECE (1) ONE SINGLE USE STERILE SYRINGE(1) ONE SINGLE USE STERILE 8MM HEGAR DILATOR(1) ONE SINGLE USE NON-STERILE DESICCANT(1) ONE NON-STERILE HANDPIECE INSTRUCTIONS FOR USE(1)  ONE NON-STERILE DILATOR INSTRUCTIONS FOR USE: Brand: MINERVA SINGLE STERILE DISPOSABLE HANDPIECE

## (undated) DEVICE — PACK ARTHROSCOPY

## (undated) DEVICE — ARTHROSCOPY FLOOR MAT

## (undated) DEVICE — TUBING IV 1/4 X 3/32 X 32IN

## (undated) DEVICE — GLOVE SRG BIOGEL 7.5

## (undated) DEVICE — GLOVE INDICATOR PI UNDERGLOVE SZ 7.5 BLUE

## (undated) DEVICE — BLADE SHAVER TORPEDO CRV 4MM 13MM COOLCUT

## (undated) DEVICE — TIBURON SPLIT SHEET: Brand: CONVERTORS

## (undated) DEVICE — BETHLEHEM UNIVERSAL MINOR VAG: Brand: CARDINAL HEALTH

## (undated) DEVICE — GLOVE SRG BIOGEL 8

## (undated) DEVICE — TIBURON EXTREMITY SHEET: Brand: CONVERTORS

## (undated) DEVICE — INTENDED FOR TISSUE SEPARATION, AND OTHER PROCEDURES THAT REQUIRE A SHARP SURGICAL BLADE TO PUNCTURE OR CUT.: Brand: BARD-PARKER SAFETY BLADES SIZE 11, STERILE

## (undated) DEVICE — TUBING ARTHROSCOPIC WAVE  MAIN PUMP

## (undated) DEVICE — GLOVE PI ULTRA TOUCH SZ.7.0